# Patient Record
Sex: FEMALE | Race: WHITE | NOT HISPANIC OR LATINO | Employment: OTHER | URBAN - METROPOLITAN AREA
[De-identification: names, ages, dates, MRNs, and addresses within clinical notes are randomized per-mention and may not be internally consistent; named-entity substitution may affect disease eponyms.]

---

## 2017-01-05 ENCOUNTER — HOSPITAL ENCOUNTER (EMERGENCY)
Facility: HOSPITAL | Age: 47
Discharge: HOME | End: 2017-01-05
Attending: EMERGENCY MEDICINE | Admitting: EMERGENCY MEDICINE
Payer: COMMERCIAL

## 2017-01-05 VITALS
OXYGEN SATURATION: 96 % | WEIGHT: 165.79 LBS | BODY MASS INDEX: 29.38 KG/M2 | SYSTOLIC BLOOD PRESSURE: 138 MMHG | RESPIRATION RATE: 18 BRPM | DIASTOLIC BLOOD PRESSURE: 65 MMHG | HEIGHT: 63 IN | HEART RATE: 99 BPM | TEMPERATURE: 98.1 F

## 2017-01-05 DIAGNOSIS — R20.2 PARESTHESIAS: Primary | ICD-10-CM

## 2017-01-05 LAB
ALBUMIN SERPL BCP-MCNC: 4.2 G/DL (ref 3.5–5)
ALP SERPL-CCNC: 95 U/L (ref 46–116)
ALT SERPL W P-5'-P-CCNC: 23 U/L (ref 12–78)
ANION GAP SERPL CALCULATED.3IONS-SCNC: 13 MMOL/L (ref 4–13)
AST SERPL W P-5'-P-CCNC: 14 U/L (ref 5–45)
BASOPHILS # BLD AUTO: 0.03 THOUSANDS/ΜL (ref 0–0.1)
BASOPHILS NFR BLD AUTO: 0 % (ref 0–1)
BILIRUB SERPL-MCNC: 0.5 MG/DL (ref 0.2–1)
BUN SERPL-MCNC: 10 MG/DL (ref 5–25)
CALCIUM SERPL-MCNC: 9.3 MG/DL (ref 8.3–10.1)
CHLORIDE SERPL-SCNC: 102 MMOL/L (ref 100–108)
CO2 SERPL-SCNC: 25 MMOL/L (ref 21–32)
CREAT SERPL-MCNC: 0.68 MG/DL (ref 0.6–1.3)
EOSINOPHIL # BLD AUTO: 0.19 THOUSAND/ΜL (ref 0–0.61)
EOSINOPHIL NFR BLD AUTO: 2 % (ref 0–6)
ERYTHROCYTE [DISTWIDTH] IN BLOOD BY AUTOMATED COUNT: 12.8 % (ref 11.6–15.1)
GFR SERPL CREATININE-BSD FRML MDRD: >60 ML/MIN/1.73SQ M
GLUCOSE SERPL-MCNC: 128 MG/DL (ref 65–140)
HCT VFR BLD AUTO: 43.6 % (ref 34.8–46.1)
HGB BLD-MCNC: 15 G/DL (ref 11.5–15.4)
LYMPHOCYTES # BLD AUTO: 1.71 THOUSANDS/ΜL (ref 0.6–4.47)
LYMPHOCYTES NFR BLD AUTO: 22 % (ref 14–44)
MCH RBC QN AUTO: 30.4 PG (ref 26.8–34.3)
MCHC RBC AUTO-ENTMCNC: 34.4 G/DL (ref 31.4–37.4)
MCV RBC AUTO: 88 FL (ref 82–98)
MONOCYTES # BLD AUTO: 0.36 THOUSAND/ΜL (ref 0.17–1.22)
MONOCYTES NFR BLD AUTO: 5 % (ref 4–12)
NEUTROPHILS # BLD AUTO: 5.58 THOUSANDS/ΜL (ref 1.85–7.62)
NEUTS SEG NFR BLD AUTO: 71 % (ref 43–75)
NRBC BLD AUTO-RTO: 0 /100 WBCS
PLATELET # BLD AUTO: 316 THOUSANDS/UL (ref 149–390)
PMV BLD AUTO: 9.4 FL (ref 8.9–12.7)
POTASSIUM SERPL-SCNC: 4 MMOL/L (ref 3.5–5.3)
PROT SERPL-MCNC: 7.7 G/DL (ref 6.4–8.2)
RBC # BLD AUTO: 4.94 MILLION/UL (ref 3.81–5.12)
SODIUM SERPL-SCNC: 140 MMOL/L (ref 136–145)
TSH SERPL DL<=0.05 MIU/L-ACNC: 0.89 UIU/ML (ref 0.36–3.74)
WBC # BLD AUTO: 7.89 THOUSAND/UL (ref 4.31–10.16)

## 2017-01-05 PROCEDURE — 85025 COMPLETE CBC W/AUTO DIFF WBC: CPT | Performed by: EMERGENCY MEDICINE

## 2017-01-05 PROCEDURE — 36415 COLL VENOUS BLD VENIPUNCTURE: CPT | Performed by: EMERGENCY MEDICINE

## 2017-01-05 PROCEDURE — 99284 EMERGENCY DEPT VISIT MOD MDM: CPT

## 2017-01-05 PROCEDURE — 80053 COMPREHEN METABOLIC PANEL: CPT | Performed by: EMERGENCY MEDICINE

## 2017-01-05 PROCEDURE — 84443 ASSAY THYROID STIM HORMONE: CPT | Performed by: EMERGENCY MEDICINE

## 2017-01-05 RX ORDER — OXYCODONE AND ACETAMINOPHEN 10; 325 MG/1; MG/1
1 TABLET ORAL EVERY 6 HOURS PRN
COMMUNITY
End: 2017-02-16 | Stop reason: ALTCHOICE

## 2017-01-05 RX ORDER — LEVOTHYROXINE SODIUM 0.07 MG/1
75 TABLET ORAL DAILY
Status: ON HOLD | COMMUNITY
End: 2017-03-02 | Stop reason: CLARIF

## 2017-01-05 RX ORDER — MORPHINE SULFATE 20 MG/1
15 CAPSULE, EXTENDED RELEASE ORAL 3 TIMES DAILY
COMMUNITY

## 2017-01-05 RX ORDER — BACLOFEN 10 MG/1
10 TABLET ORAL 3 TIMES DAILY
COMMUNITY

## 2017-01-11 ENCOUNTER — TELEPHONE (OUTPATIENT)
Dept: FAMILY MEDICINE CLINIC | Facility: CLINIC | Age: 47
End: 2017-01-11

## 2017-01-11 NOTE — TELEPHONE ENCOUNTER
Patient on [employer] list. Called phone number listed on registration paperwork. Phone kept ringing and then busy signal. Not able to leave voicemail. Still under  care?

## 2017-02-06 ENCOUNTER — HOSPITAL ENCOUNTER (OUTPATIENT)
Facility: HOSPITAL | Age: 47
Setting detail: OBSERVATION
Discharge: HOME | End: 2017-02-07
Attending: EMERGENCY MEDICINE | Admitting: INTERNAL MEDICINE
Payer: COMMERCIAL

## 2017-02-06 DIAGNOSIS — M54.9 BACK PAIN: Primary | ICD-10-CM

## 2017-02-06 LAB
ALBUMIN SERPL BCP-MCNC: 3.7 G/DL (ref 3.5–5)
ALP SERPL-CCNC: 90 U/L (ref 46–116)
ALT SERPL W P-5'-P-CCNC: 28 U/L (ref 12–78)
ANION GAP SERPL CALCULATED.3IONS-SCNC: 8 MMOL/L (ref 4–13)
AST SERPL W P-5'-P-CCNC: 18 U/L (ref 5–45)
BASOPHILS # BLD AUTO: 0.04 THOUSANDS/ΜL (ref 0–0.1)
BASOPHILS NFR BLD AUTO: 1 % (ref 0–1)
BILIRUB SERPL-MCNC: 0.2 MG/DL (ref 0.2–1)
BILIRUB UR QL STRIP: NEGATIVE
BUN SERPL-MCNC: 9 MG/DL (ref 5–25)
CALCIUM SERPL-MCNC: 9.1 MG/DL (ref 8.3–10.1)
CHLORIDE SERPL-SCNC: 103 MMOL/L (ref 100–108)
CLARITY UR: CLEAR
CO2 SERPL-SCNC: 31 MMOL/L (ref 21–32)
COLOR UR: YELLOW
CREAT SERPL-MCNC: 0.81 MG/DL (ref 0.6–1.3)
EOSINOPHIL # BLD AUTO: 0.52 THOUSAND/ΜL (ref 0–0.61)
EOSINOPHIL NFR BLD AUTO: 8 % (ref 0–6)
ERYTHROCYTE [DISTWIDTH] IN BLOOD BY AUTOMATED COUNT: 12.9 % (ref 11.6–15.1)
GFR SERPL CREATININE-BSD FRML MDRD: >60 ML/MIN/1.73SQ M
GLUCOSE SERPL-MCNC: 100 MG/DL (ref 65–140)
GLUCOSE UR STRIP-MCNC: NEGATIVE MG/DL
HCG UR QL: NEGATIVE
HCT VFR BLD AUTO: 39 % (ref 34.8–46.1)
HGB BLD-MCNC: 13.1 G/DL (ref 11.5–15.4)
HGB UR QL STRIP.AUTO: NEGATIVE
KETONES UR STRIP-MCNC: NEGATIVE MG/DL
LEUKOCYTE ESTERASE UR QL STRIP: NEGATIVE
LIPASE SERPL-CCNC: 86 U/L (ref 73–393)
LYMPHOCYTES # BLD AUTO: 3.26 THOUSANDS/ΜL (ref 0.6–4.47)
LYMPHOCYTES NFR BLD AUTO: 48 % (ref 14–44)
MCH RBC QN AUTO: 30.5 PG (ref 26.8–34.3)
MCHC RBC AUTO-ENTMCNC: 33.6 G/DL (ref 31.4–37.4)
MCV RBC AUTO: 91 FL (ref 82–98)
MONOCYTES # BLD AUTO: 0.6 THOUSAND/ΜL (ref 0.17–1.22)
MONOCYTES NFR BLD AUTO: 9 % (ref 4–12)
NEUTROPHILS # BLD AUTO: 2.35 THOUSANDS/ΜL (ref 1.85–7.62)
NEUTS SEG NFR BLD AUTO: 35 % (ref 43–75)
NITRITE UR QL STRIP: NEGATIVE
NRBC BLD AUTO-RTO: 0 /100 WBCS
PH UR STRIP.AUTO: 5.5 [PH] (ref 4.5–8)
PLATELET # BLD AUTO: 351 THOUSANDS/UL (ref 149–390)
PMV BLD AUTO: 9.3 FL (ref 8.9–12.7)
POTASSIUM SERPL-SCNC: 3.6 MMOL/L (ref 3.5–5.3)
PROT SERPL-MCNC: 7.3 G/DL (ref 6.4–8.2)
PROT UR STRIP-MCNC: NEGATIVE MG/DL
RBC # BLD AUTO: 4.29 MILLION/UL (ref 3.81–5.12)
SODIUM SERPL-SCNC: 142 MMOL/L (ref 136–145)
SP GR UR STRIP.AUTO: 1.01 (ref 1–1.03)
TSH SERPL DL<=0.05 MIU/L-ACNC: 4.13 UIU/ML (ref 0.36–3.74)
UROBILINOGEN UR QL STRIP.AUTO: 0.2 E.U./DL
WBC # BLD AUTO: 6.78 THOUSAND/UL (ref 4.31–10.16)

## 2017-02-06 PROCEDURE — 84443 ASSAY THYROID STIM HORMONE: CPT | Performed by: EMERGENCY MEDICINE

## 2017-02-06 PROCEDURE — 96375 TX/PRO/DX INJ NEW DRUG ADDON: CPT

## 2017-02-06 PROCEDURE — 96374 THER/PROPH/DIAG INJ IV PUSH: CPT

## 2017-02-06 PROCEDURE — 83690 ASSAY OF LIPASE: CPT | Performed by: EMERGENCY MEDICINE

## 2017-02-06 PROCEDURE — 81003 URINALYSIS AUTO W/O SCOPE: CPT | Performed by: EMERGENCY MEDICINE

## 2017-02-06 PROCEDURE — 81025 URINE PREGNANCY TEST: CPT | Performed by: EMERGENCY MEDICINE

## 2017-02-06 PROCEDURE — 93005 ELECTROCARDIOGRAM TRACING: CPT | Performed by: EMERGENCY MEDICINE

## 2017-02-06 PROCEDURE — 36415 COLL VENOUS BLD VENIPUNCTURE: CPT | Performed by: EMERGENCY MEDICINE

## 2017-02-06 PROCEDURE — 80053 COMPREHEN METABOLIC PANEL: CPT | Performed by: EMERGENCY MEDICINE

## 2017-02-06 PROCEDURE — 96361 HYDRATE IV INFUSION ADD-ON: CPT

## 2017-02-06 PROCEDURE — 85025 COMPLETE CBC W/AUTO DIFF WBC: CPT | Performed by: EMERGENCY MEDICINE

## 2017-02-06 RX ORDER — LORAZEPAM 2 MG/ML
0.5 INJECTION INTRAMUSCULAR ONCE
Status: COMPLETED | OUTPATIENT
Start: 2017-02-06 | End: 2017-02-06

## 2017-02-06 RX ORDER — MORPHINE SULFATE 4 MG/ML
4 INJECTION, SOLUTION INTRAMUSCULAR; INTRAVENOUS ONCE
Status: COMPLETED | OUTPATIENT
Start: 2017-02-06 | End: 2017-02-06

## 2017-02-06 RX ADMIN — LORAZEPAM 0.5 MG: 2 INJECTION INTRAMUSCULAR; INTRAVENOUS at 23:08

## 2017-02-06 RX ADMIN — SODIUM CHLORIDE 1000 ML: 0.9 INJECTION, SOLUTION INTRAVENOUS at 23:11

## 2017-02-06 RX ADMIN — MORPHINE SULFATE 4 MG: 4 INJECTION, SOLUTION INTRAMUSCULAR; INTRAVENOUS at 23:07

## 2017-02-07 ENCOUNTER — TRANSCRIBE ORDERS (OUTPATIENT)
Dept: MRI IMAGING | Facility: HOSPITAL | Age: 47
End: 2017-02-07

## 2017-02-07 ENCOUNTER — APPOINTMENT (EMERGENCY)
Dept: CT IMAGING | Facility: HOSPITAL | Age: 47
End: 2017-02-07
Payer: COMMERCIAL

## 2017-02-07 ENCOUNTER — HOSPITAL ENCOUNTER (OUTPATIENT)
Dept: MRI IMAGING | Facility: HOSPITAL | Age: 47
Discharge: HOME | End: 2017-02-07
Attending: EMERGENCY MEDICINE
Payer: COMMERCIAL

## 2017-02-07 VITALS
RESPIRATION RATE: 18 BRPM | SYSTOLIC BLOOD PRESSURE: 114 MMHG | WEIGHT: 165 LBS | HEART RATE: 86 BPM | TEMPERATURE: 98.2 F | BODY MASS INDEX: 28.17 KG/M2 | OXYGEN SATURATION: 100 % | HEIGHT: 64 IN | DIASTOLIC BLOOD PRESSURE: 65 MMHG

## 2017-02-07 LAB
ATRIAL RATE: 71 BPM
P AXIS: 61 DEGREES
PR INTERVAL: 150 MS
QRS AXIS: 72 DEGREES
QRSD INTERVAL: 80 MS
QT INTERVAL: 398 MS
QTC INTERVAL: 432 MS
T WAVE AXIS: 49 DEGREES
VENTRICULAR RATE: 71 BPM

## 2017-02-07 PROCEDURE — 72148 MRI LUMBAR SPINE W/O DYE: CPT

## 2017-02-07 PROCEDURE — 99284 EMERGENCY DEPT VISIT MOD MDM: CPT

## 2017-02-07 PROCEDURE — 96361 HYDRATE IV INFUSION ADD-ON: CPT

## 2017-02-07 PROCEDURE — 74177 CT ABD & PELVIS W/CONTRAST: CPT

## 2017-02-07 RX ORDER — KETOROLAC TROMETHAMINE 30 MG/ML
15 INJECTION, SOLUTION INTRAMUSCULAR; INTRAVENOUS ONCE
Status: COMPLETED | OUTPATIENT
Start: 2017-02-07 | End: 2017-02-07

## 2017-02-07 RX ADMIN — IOHEXOL 100 ML: 350 INJECTION, SOLUTION INTRAVENOUS at 00:45

## 2017-02-07 RX ADMIN — KETOROLAC TROMETHAMINE 15 MG: 30 INJECTION, SOLUTION INTRAMUSCULAR at 02:40

## 2017-02-08 ENCOUNTER — HOSPITAL ENCOUNTER (OUTPATIENT)
Dept: MRI IMAGING | Facility: HOSPITAL | Age: 47
Discharge: HOME | End: 2017-02-08
Attending: EMERGENCY MEDICINE
Payer: COMMERCIAL

## 2017-02-08 DIAGNOSIS — M54.9 BACK PAIN: ICD-10-CM

## 2017-02-10 ENCOUNTER — ALLSCRIPTS OFFICE VISIT (OUTPATIENT)
Dept: OTHER | Facility: OTHER | Age: 47
End: 2017-02-10

## 2017-02-10 DIAGNOSIS — R92.2 INCONCLUSIVE MAMMOGRAM: ICD-10-CM

## 2017-02-10 DIAGNOSIS — R10.9 ABDOMINAL PAIN: ICD-10-CM

## 2017-02-10 DIAGNOSIS — M79.10 MYALGIA: ICD-10-CM

## 2017-02-10 DIAGNOSIS — E04.9 NONTOXIC GOITER: ICD-10-CM

## 2017-02-10 DIAGNOSIS — G62.9 POLYNEUROPATHY: ICD-10-CM

## 2017-02-11 ENCOUNTER — APPOINTMENT (OUTPATIENT)
Dept: LAB | Facility: CLINIC | Age: 47
End: 2017-02-11
Payer: COMMERCIAL

## 2017-02-11 ENCOUNTER — HOSPITAL ENCOUNTER (EMERGENCY)
Facility: HOSPITAL | Age: 47
Discharge: HOME | End: 2017-02-11
Admitting: EMERGENCY MEDICINE
Payer: COMMERCIAL

## 2017-02-11 VITALS
HEART RATE: 118 BPM | OXYGEN SATURATION: 98 % | RESPIRATION RATE: 18 BRPM | HEIGHT: 64 IN | SYSTOLIC BLOOD PRESSURE: 139 MMHG | DIASTOLIC BLOOD PRESSURE: 86 MMHG | WEIGHT: 165 LBS | BODY MASS INDEX: 28.17 KG/M2 | TEMPERATURE: 97.8 F

## 2017-02-11 DIAGNOSIS — B37.0 ORAL THRUSH: ICD-10-CM

## 2017-02-11 DIAGNOSIS — M79.10 MYALGIA: ICD-10-CM

## 2017-02-11 DIAGNOSIS — B02.9 SHINGLES: Primary | ICD-10-CM

## 2017-02-11 DIAGNOSIS — G62.9 POLYNEUROPATHY: ICD-10-CM

## 2017-02-11 DIAGNOSIS — E04.9 NONTOXIC GOITER: ICD-10-CM

## 2017-02-11 LAB
25(OH)D3 SERPL-MCNC: 36.2 NG/ML (ref 30–100)
BASOPHILS # BLD AUTO: 0.03 THOUSANDS/ΜL (ref 0–0.1)
BASOPHILS NFR BLD AUTO: 0 % (ref 0–1)
CK SERPL-CCNC: 49 U/L (ref 26–192)
EOSINOPHIL # BLD AUTO: 0.35 THOUSAND/ΜL (ref 0–0.61)
EOSINOPHIL NFR BLD AUTO: 4 % (ref 0–6)
ERYTHROCYTE [DISTWIDTH] IN BLOOD BY AUTOMATED COUNT: 13.6 % (ref 11.6–15.1)
ERYTHROCYTE [SEDIMENTATION RATE] IN BLOOD: 14 MM/HOUR (ref 0–20)
FOLATE SERPL-MCNC: 18 NG/ML (ref 3.1–17.5)
HCT VFR BLD AUTO: 44.2 % (ref 34.8–46.1)
HGB BLD-MCNC: 15.1 G/DL (ref 11.5–15.4)
LYMPHOCYTES # BLD AUTO: 2.18 THOUSANDS/ΜL (ref 0.6–4.47)
LYMPHOCYTES NFR BLD AUTO: 23 % (ref 14–44)
MCH RBC QN AUTO: 31.2 PG (ref 26.8–34.3)
MCHC RBC AUTO-ENTMCNC: 34.2 G/DL (ref 31.4–37.4)
MCV RBC AUTO: 91 FL (ref 82–98)
MONOCYTES # BLD AUTO: 0.9 THOUSAND/ΜL (ref 0.17–1.22)
MONOCYTES NFR BLD AUTO: 10 % (ref 4–12)
NEUTROPHILS # BLD AUTO: 5.88 THOUSANDS/ΜL (ref 1.85–7.62)
NEUTS SEG NFR BLD AUTO: 63 % (ref 43–75)
NRBC BLD AUTO-RTO: 0 /100 WBCS
PLATELET # BLD AUTO: 405 THOUSANDS/UL (ref 149–390)
PMV BLD AUTO: 10.1 FL (ref 8.9–12.7)
RBC # BLD AUTO: 4.84 MILLION/UL (ref 3.81–5.12)
T3FREE SERPL-MCNC: 2.55 PG/ML (ref 2.3–4.2)
T4 FREE SERPL-MCNC: 1.59 NG/DL (ref 0.76–1.46)
TSH SERPL DL<=0.05 MIU/L-ACNC: 2.2 UIU/ML (ref 0.36–3.74)
VIT B12 SERPL-MCNC: 617 PG/ML (ref 100–900)
WBC # BLD AUTO: 9.37 THOUSAND/UL (ref 4.31–10.16)

## 2017-02-11 PROCEDURE — 84443 ASSAY THYROID STIM HORMONE: CPT

## 2017-02-11 PROCEDURE — 82175 ASSAY OF ARSENIC: CPT

## 2017-02-11 PROCEDURE — 82085 ASSAY OF ALDOLASE: CPT

## 2017-02-11 PROCEDURE — 86800 THYROGLOBULIN ANTIBODY: CPT

## 2017-02-11 PROCEDURE — 84439 ASSAY OF FREE THYROXINE: CPT

## 2017-02-11 PROCEDURE — 84481 FREE ASSAY (FT-3): CPT

## 2017-02-11 PROCEDURE — 36415 COLL VENOUS BLD VENIPUNCTURE: CPT

## 2017-02-11 PROCEDURE — 83825 ASSAY OF MERCURY: CPT

## 2017-02-11 PROCEDURE — 86376 MICROSOMAL ANTIBODY EACH: CPT

## 2017-02-11 PROCEDURE — 82306 VITAMIN D 25 HYDROXY: CPT

## 2017-02-11 PROCEDURE — 82550 ASSAY OF CK (CPK): CPT

## 2017-02-11 PROCEDURE — 81374 HLA I TYPING 1 ANTIGEN LR: CPT

## 2017-02-11 PROCEDURE — 86618 LYME DISEASE ANTIBODY: CPT

## 2017-02-11 PROCEDURE — 82607 VITAMIN B-12: CPT

## 2017-02-11 PROCEDURE — 83655 ASSAY OF LEAD: CPT

## 2017-02-11 PROCEDURE — 86592 SYPHILIS TEST NON-TREP QUAL: CPT

## 2017-02-11 PROCEDURE — 99282 EMERGENCY DEPT VISIT SF MDM: CPT

## 2017-02-11 PROCEDURE — 85652 RBC SED RATE AUTOMATED: CPT

## 2017-02-11 PROCEDURE — 82746 ASSAY OF FOLIC ACID SERUM: CPT

## 2017-02-11 PROCEDURE — 85025 COMPLETE CBC W/AUTO DIFF WBC: CPT

## 2017-02-11 PROCEDURE — 86038 ANTINUCLEAR ANTIBODIES: CPT

## 2017-02-11 PROCEDURE — 84207 ASSAY OF VITAMIN B-6: CPT

## 2017-02-11 RX ORDER — OXYCODONE HYDROCHLORIDE 5 MG/1
15 TABLET ORAL EVERY 4 HOURS PRN
Qty: 12 TABLET | Refills: 0 | Status: SHIPPED | OUTPATIENT
Start: 2017-02-11 | End: 2017-02-21

## 2017-02-11 RX ORDER — ACYCLOVIR 800 MG/1
800 TABLET ORAL
Qty: 35 TABLET | Refills: 0 | Status: SHIPPED | OUTPATIENT
Start: 2017-02-11 | End: 2017-02-19

## 2017-02-12 ENCOUNTER — HOSPITAL ENCOUNTER (EMERGENCY)
Facility: HOSPITAL | Age: 47
Discharge: HOME | End: 2017-02-12
Attending: EMERGENCY MEDICINE | Admitting: EMERGENCY MEDICINE
Payer: COMMERCIAL

## 2017-02-12 ENCOUNTER — GENERIC CONVERSION - ENCOUNTER (OUTPATIENT)
Dept: OTHER | Facility: OTHER | Age: 47
End: 2017-02-12

## 2017-02-12 VITALS
BODY MASS INDEX: 28.98 KG/M2 | DIASTOLIC BLOOD PRESSURE: 71 MMHG | HEART RATE: 100 BPM | TEMPERATURE: 98.5 F | HEIGHT: 64 IN | SYSTOLIC BLOOD PRESSURE: 115 MMHG | RESPIRATION RATE: 18 BRPM | OXYGEN SATURATION: 97 % | WEIGHT: 169.75 LBS

## 2017-02-12 DIAGNOSIS — R00.2 PALPITATIONS: Primary | ICD-10-CM

## 2017-02-12 DIAGNOSIS — T50.905A: ICD-10-CM

## 2017-02-12 LAB
ANION GAP SERPL CALCULATED.3IONS-SCNC: 10 MMOL/L (ref 4–13)
BASOPHILS # BLD AUTO: 0.03 THOUSANDS/ΜL (ref 0–0.1)
BASOPHILS NFR BLD AUTO: 0 % (ref 0–1)
BUN SERPL-MCNC: 16 MG/DL (ref 5–25)
CALCIUM SERPL-MCNC: 9.1 MG/DL (ref 8.3–10.1)
CHLORIDE SERPL-SCNC: 99 MMOL/L (ref 100–108)
CO2 SERPL-SCNC: 27 MMOL/L (ref 21–32)
CREAT SERPL-MCNC: 0.77 MG/DL (ref 0.6–1.3)
EOSINOPHIL # BLD AUTO: 0.33 THOUSAND/ΜL (ref 0–0.61)
EOSINOPHIL NFR BLD AUTO: 4 % (ref 0–6)
ERYTHROCYTE [DISTWIDTH] IN BLOOD BY AUTOMATED COUNT: 13 % (ref 11.6–15.1)
GFR SERPL CREATININE-BSD FRML MDRD: >60 ML/MIN/1.73SQ M
GLUCOSE SERPL-MCNC: 100 MG/DL (ref 65–140)
HCT VFR BLD AUTO: 40.4 % (ref 34.8–46.1)
HGB BLD-MCNC: 14 G/DL (ref 11.5–15.4)
LYMPHOCYTES # BLD AUTO: 2.22 THOUSANDS/ΜL (ref 0.6–4.47)
LYMPHOCYTES NFR BLD AUTO: 30 % (ref 14–44)
MAGNESIUM SERPL-MCNC: 1.7 MG/DL (ref 1.6–2.6)
MCH RBC QN AUTO: 30.4 PG (ref 26.8–34.3)
MCHC RBC AUTO-ENTMCNC: 34.7 G/DL (ref 31.4–37.4)
MCV RBC AUTO: 88 FL (ref 82–98)
MONOCYTES # BLD AUTO: 0.7 THOUSAND/ΜL (ref 0.17–1.22)
MONOCYTES NFR BLD AUTO: 9 % (ref 4–12)
NEUTROPHILS # BLD AUTO: 4.11 THOUSANDS/ΜL (ref 1.85–7.62)
NEUTS SEG NFR BLD AUTO: 56 % (ref 43–75)
NRBC BLD AUTO-RTO: 0 /100 WBCS
PHOSPHATE SERPL-MCNC: 4.7 MG/DL (ref 2.7–4.5)
PLATELET # BLD AUTO: 337 THOUSANDS/UL (ref 149–390)
PMV BLD AUTO: 8.9 FL (ref 8.9–12.7)
POTASSIUM SERPL-SCNC: 3.6 MMOL/L (ref 3.5–5.3)
RBC # BLD AUTO: 4.6 MILLION/UL (ref 3.81–5.12)
SODIUM SERPL-SCNC: 136 MMOL/L (ref 136–145)
TSH SERPL DL<=0.05 MIU/L-ACNC: 7.81 UIU/ML (ref 0.36–3.74)
WBC # BLD AUTO: 7.42 THOUSAND/UL (ref 4.31–10.16)

## 2017-02-12 PROCEDURE — 84100 ASSAY OF PHOSPHORUS: CPT | Performed by: EMERGENCY MEDICINE

## 2017-02-12 PROCEDURE — 84443 ASSAY THYROID STIM HORMONE: CPT | Performed by: EMERGENCY MEDICINE

## 2017-02-12 PROCEDURE — 99283 EMERGENCY DEPT VISIT LOW MDM: CPT

## 2017-02-12 PROCEDURE — A9270 NON-COVERED ITEM OR SERVICE: HCPCS

## 2017-02-12 PROCEDURE — 36415 COLL VENOUS BLD VENIPUNCTURE: CPT | Performed by: EMERGENCY MEDICINE

## 2017-02-12 PROCEDURE — 85025 COMPLETE CBC W/AUTO DIFF WBC: CPT | Performed by: EMERGENCY MEDICINE

## 2017-02-12 PROCEDURE — 93005 ELECTROCARDIOGRAM TRACING: CPT | Performed by: EMERGENCY MEDICINE

## 2017-02-12 PROCEDURE — 80048 BASIC METABOLIC PNL TOTAL CA: CPT | Performed by: EMERGENCY MEDICINE

## 2017-02-12 PROCEDURE — 96361 HYDRATE IV INFUSION ADD-ON: CPT

## 2017-02-12 PROCEDURE — 96374 THER/PROPH/DIAG INJ IV PUSH: CPT

## 2017-02-12 PROCEDURE — 83735 ASSAY OF MAGNESIUM: CPT | Performed by: EMERGENCY MEDICINE

## 2017-02-12 PROCEDURE — 93005 ELECTROCARDIOGRAM TRACING: CPT

## 2017-02-12 RX ORDER — OXYCODONE HYDROCHLORIDE AND ACETAMINOPHEN 5; 325 MG/1; MG/1
TABLET ORAL
Status: COMPLETED
Start: 2017-02-12 | End: 2017-02-12

## 2017-02-12 RX ORDER — LEVOTHYROXINE SODIUM 0.12 MG/1
125 TABLET ORAL DAILY
Qty: 30 TABLET | Refills: 0 | Status: SHIPPED | OUTPATIENT
Start: 2017-02-12 | End: 2017-02-18 | Stop reason: ALTCHOICE

## 2017-02-12 RX ORDER — LORAZEPAM 2 MG/ML
1 INJECTION INTRAMUSCULAR ONCE
Status: COMPLETED | OUTPATIENT
Start: 2017-02-12 | End: 2017-02-12

## 2017-02-12 RX ORDER — OXYCODONE HYDROCHLORIDE AND ACETAMINOPHEN 5; 325 MG/1; MG/1
1 TABLET ORAL ONCE
Status: COMPLETED | OUTPATIENT
Start: 2017-02-12 | End: 2017-02-12

## 2017-02-12 RX ADMIN — OXYCODONE HYDROCHLORIDE AND ACETAMINOPHEN 1 TABLET: 5; 325 TABLET ORAL at 17:57

## 2017-02-12 RX ADMIN — LORAZEPAM 1 MG: 2 INJECTION INTRAMUSCULAR; INTRAVENOUS at 16:22

## 2017-02-12 RX ADMIN — SODIUM CHLORIDE 1000 ML: 0.9 INJECTION, SOLUTION INTRAVENOUS at 16:22

## 2017-02-13 ENCOUNTER — ALLSCRIPTS OFFICE VISIT (OUTPATIENT)
Dept: OTHER | Facility: OTHER | Age: 47
End: 2017-02-13

## 2017-02-13 LAB
ALDOLASE SERPL-CCNC: 6.2 U/L (ref 3.3–10.3)
ATRIAL RATE: 101 BPM
ATRIAL RATE: 104 BPM
B BURGDOR IGG SER IA-ACNC: 0.08
B BURGDOR IGM SER IA-ACNC: 0.16
P AXIS: 53 DEGREES
P AXIS: 55 DEGREES
PR INTERVAL: 122 MS
PR INTERVAL: 122 MS
QRS AXIS: 66 DEGREES
QRS AXIS: 68 DEGREES
QRSD INTERVAL: 78 MS
QRSD INTERVAL: 80 MS
QT INTERVAL: 336 MS
QT INTERVAL: 336 MS
QTC INTERVAL: 435 MS
QTC INTERVAL: 441 MS
RPR SER QL: NORMAL
RYE IGE QN: NEGATIVE
T WAVE AXIS: 44 DEGREES
T WAVE AXIS: 49 DEGREES
VENTRICULAR RATE: 101 BPM
VENTRICULAR RATE: 104 BPM

## 2017-02-14 LAB
THYROGLOB AB SERPL-ACNC: 6.4 IU/ML (ref 0–0.9)
THYROPEROXIDASE AB SERPL-ACNC: 449 IU/ML (ref 0–34)

## 2017-02-15 ENCOUNTER — HOSPITAL ENCOUNTER (OUTPATIENT)
Dept: ULTRASOUND IMAGING | Facility: HOSPITAL | Age: 47
Discharge: HOME | End: 2017-02-15
Payer: COMMERCIAL

## 2017-02-15 DIAGNOSIS — E04.9 NONTOXIC GOITER: ICD-10-CM

## 2017-02-15 LAB
HLA-B27 QL NAA+PROBE: NEGATIVE
VIT B6 SERPL-MCNC: 25.7 UG/L (ref 2–32.8)

## 2017-02-15 PROCEDURE — 76536 US EXAM OF HEAD AND NECK: CPT

## 2017-02-16 ENCOUNTER — HOSPITAL ENCOUNTER (EMERGENCY)
Facility: HOSPITAL | Age: 47
Discharge: HOME | End: 2017-02-16
Attending: EMERGENCY MEDICINE | Admitting: EMERGENCY MEDICINE
Payer: COMMERCIAL

## 2017-02-16 ENCOUNTER — ALLSCRIPTS OFFICE VISIT (OUTPATIENT)
Dept: OTHER | Facility: OTHER | Age: 47
End: 2017-02-16

## 2017-02-16 ENCOUNTER — APPOINTMENT (EMERGENCY)
Dept: RADIOLOGY | Facility: HOSPITAL | Age: 47
End: 2017-02-16
Payer: COMMERCIAL

## 2017-02-16 VITALS
HEIGHT: 64 IN | TEMPERATURE: 98.3 F | BODY MASS INDEX: 28.85 KG/M2 | RESPIRATION RATE: 16 BRPM | OXYGEN SATURATION: 98 % | WEIGHT: 169 LBS | DIASTOLIC BLOOD PRESSURE: 88 MMHG | HEART RATE: 92 BPM | SYSTOLIC BLOOD PRESSURE: 120 MMHG

## 2017-02-16 DIAGNOSIS — K21.9 ACID REFLUX: Primary | ICD-10-CM

## 2017-02-16 LAB
ATRIAL RATE: 88 BPM
P AXIS: 53 DEGREES
PR INTERVAL: 144 MS
QRS AXIS: 68 DEGREES
QRSD INTERVAL: 82 MS
QT INTERVAL: 378 MS
QTC INTERVAL: 457 MS
T WAVE AXIS: 50 DEGREES
VENTRICULAR RATE: 88 BPM

## 2017-02-16 PROCEDURE — 71020 HB CHEST X-RAY 2VW FRONTAL&LATL: CPT

## 2017-02-16 PROCEDURE — 99285 EMERGENCY DEPT VISIT HI MDM: CPT

## 2017-02-16 PROCEDURE — 93005 ELECTROCARDIOGRAM TRACING: CPT | Performed by: EMERGENCY MEDICINE

## 2017-02-16 RX ORDER — LANSOPRAZOLE 15 MG/1
15 CAPSULE, DELAYED RELEASE ORAL DAILY
Qty: 30 CAPSULE | Refills: 0 | Status: SHIPPED | OUTPATIENT
Start: 2017-02-16 | End: 2017-03-18

## 2017-02-17 ENCOUNTER — ALLSCRIPTS OFFICE VISIT (OUTPATIENT)
Dept: OTHER | Facility: OTHER | Age: 47
End: 2017-02-17

## 2017-02-17 LAB
ARSENIC BLD-MCNC: 8 UG/L (ref 2–23)
LEAD BLD-MCNC: 1 UG/DL (ref 0–19)
MERCURY BLD-MCNC: NORMAL UG/L (ref 0–14.9)

## 2017-02-18 ENCOUNTER — HOSPITAL ENCOUNTER (EMERGENCY)
Facility: HOSPITAL | Age: 47
Discharge: HOME | End: 2017-02-18
Attending: EMERGENCY MEDICINE
Payer: COMMERCIAL

## 2017-02-18 ENCOUNTER — APPOINTMENT (EMERGENCY)
Dept: RADIOLOGY | Facility: HOSPITAL | Age: 47
End: 2017-02-18
Payer: COMMERCIAL

## 2017-02-18 ENCOUNTER — GENERIC CONVERSION - ENCOUNTER (OUTPATIENT)
Dept: OTHER | Facility: OTHER | Age: 47
End: 2017-02-18

## 2017-02-18 ENCOUNTER — HOSPITAL ENCOUNTER (EMERGENCY)
Facility: HOSPITAL | Age: 47
Discharge: HOME | End: 2017-02-18
Attending: EMERGENCY MEDICINE | Admitting: EMERGENCY MEDICINE
Payer: COMMERCIAL

## 2017-02-18 VITALS
HEIGHT: 64 IN | RESPIRATION RATE: 18 BRPM | SYSTOLIC BLOOD PRESSURE: 136 MMHG | BODY MASS INDEX: 30.86 KG/M2 | OXYGEN SATURATION: 99 % | DIASTOLIC BLOOD PRESSURE: 76 MMHG | WEIGHT: 180.78 LBS | HEART RATE: 96 BPM | TEMPERATURE: 98.6 F

## 2017-02-18 VITALS
WEIGHT: 180.78 LBS | TEMPERATURE: 98 F | RESPIRATION RATE: 16 BRPM | DIASTOLIC BLOOD PRESSURE: 71 MMHG | SYSTOLIC BLOOD PRESSURE: 133 MMHG | HEIGHT: 64 IN | BODY MASS INDEX: 30.86 KG/M2 | HEART RATE: 93 BPM | OXYGEN SATURATION: 95 %

## 2017-02-18 DIAGNOSIS — F41.0 PANIC ATTACK: Primary | ICD-10-CM

## 2017-02-18 DIAGNOSIS — R00.2 PALPITATIONS: Primary | ICD-10-CM

## 2017-02-18 LAB
ALBUMIN SERPL BCP-MCNC: 3.8 G/DL (ref 3.5–5)
ALP SERPL-CCNC: 103 U/L (ref 46–116)
ALT SERPL W P-5'-P-CCNC: 26 U/L (ref 12–78)
ANION GAP SERPL CALCULATED.3IONS-SCNC: 12 MMOL/L (ref 4–13)
AST SERPL W P-5'-P-CCNC: 13 U/L (ref 5–45)
BASOPHILS # BLD AUTO: 0.06 THOUSANDS/ΜL (ref 0–0.1)
BASOPHILS NFR BLD AUTO: 1 % (ref 0–1)
BILIRUB SERPL-MCNC: 0.5 MG/DL (ref 0.2–1)
BILIRUB UR QL STRIP: NEGATIVE
BUN SERPL-MCNC: 8 MG/DL (ref 5–25)
CALCIUM SERPL-MCNC: 9.3 MG/DL (ref 8.3–10.1)
CHLORIDE SERPL-SCNC: 102 MMOL/L (ref 100–108)
CLARITY UR: CLEAR
CO2 SERPL-SCNC: 26 MMOL/L (ref 21–32)
COLOR UR: YELLOW
CREAT SERPL-MCNC: 0.67 MG/DL (ref 0.6–1.3)
EOSINOPHIL # BLD AUTO: 0.21 THOUSAND/ΜL (ref 0–0.61)
EOSINOPHIL NFR BLD AUTO: 3 % (ref 0–6)
ERYTHROCYTE [DISTWIDTH] IN BLOOD BY AUTOMATED COUNT: 12.9 % (ref 11.6–15.1)
GFR SERPL CREATININE-BSD FRML MDRD: >60 ML/MIN/1.73SQ M
GLUCOSE SERPL-MCNC: 104 MG/DL (ref 65–140)
GLUCOSE UR STRIP-MCNC: NEGATIVE MG/DL
HCG UR QL: NEGATIVE
HCT VFR BLD AUTO: 37.5 % (ref 34.8–46.1)
HGB BLD-MCNC: 12.8 G/DL (ref 11.5–15.4)
HGB UR QL STRIP.AUTO: NEGATIVE
KETONES UR STRIP-MCNC: NEGATIVE MG/DL
LEUKOCYTE ESTERASE UR QL STRIP: NEGATIVE
LIPASE SERPL-CCNC: 81 U/L (ref 73–393)
LYMPHOCYTES # BLD AUTO: 3.41 THOUSANDS/ΜL (ref 0.6–4.47)
LYMPHOCYTES NFR BLD AUTO: 42 % (ref 14–44)
MCH RBC QN AUTO: 30.2 PG (ref 26.8–34.3)
MCHC RBC AUTO-ENTMCNC: 34.1 G/DL (ref 31.4–37.4)
MCV RBC AUTO: 88 FL (ref 82–98)
MONOCYTES # BLD AUTO: 0.54 THOUSAND/ΜL (ref 0.17–1.22)
MONOCYTES NFR BLD AUTO: 7 % (ref 4–12)
NEUTROPHILS # BLD AUTO: 3.93 THOUSANDS/ΜL (ref 1.85–7.62)
NEUTS SEG NFR BLD AUTO: 48 % (ref 43–75)
NITRITE UR QL STRIP: NEGATIVE
NRBC BLD AUTO-RTO: 0 /100 WBCS
PH UR STRIP.AUTO: 5.5 [PH] (ref 4.5–8)
PLATELET # BLD AUTO: 356 THOUSANDS/UL (ref 149–390)
PMV BLD AUTO: 8.7 FL (ref 8.9–12.7)
POTASSIUM SERPL-SCNC: 3.2 MMOL/L (ref 3.5–5.3)
PROT SERPL-MCNC: 7.3 G/DL (ref 6.4–8.2)
PROT UR STRIP-MCNC: NEGATIVE MG/DL
RBC # BLD AUTO: 4.24 MILLION/UL (ref 3.81–5.12)
SODIUM SERPL-SCNC: 140 MMOL/L (ref 136–145)
SP GR UR STRIP.AUTO: <=1.005 (ref 1–1.03)
T3FREE SERPL-MCNC: 3.06 PG/ML (ref 2.3–4.2)
T4 FREE SERPL-MCNC: 1.93 NG/DL (ref 0.76–1.46)
TROPONIN I SERPL-MCNC: <0.02 NG/ML
TSH SERPL DL<=0.05 MIU/L-ACNC: 4.58 UIU/ML (ref 0.36–3.74)
UROBILINOGEN UR QL STRIP.AUTO: 0.2 E.U./DL
WBC # BLD AUTO: 8.17 THOUSAND/UL (ref 4.31–10.16)

## 2017-02-18 PROCEDURE — 80053 COMPREHEN METABOLIC PANEL: CPT | Performed by: PHYSICIAN ASSISTANT

## 2017-02-18 PROCEDURE — 84443 ASSAY THYROID STIM HORMONE: CPT | Performed by: PHYSICIAN ASSISTANT

## 2017-02-18 PROCEDURE — 96374 THER/PROPH/DIAG INJ IV PUSH: CPT

## 2017-02-18 PROCEDURE — 81003 URINALYSIS AUTO W/O SCOPE: CPT | Performed by: PHYSICIAN ASSISTANT

## 2017-02-18 PROCEDURE — 84481 FREE ASSAY (FT-3): CPT | Performed by: PHYSICIAN ASSISTANT

## 2017-02-18 PROCEDURE — 99284 EMERGENCY DEPT VISIT MOD MDM: CPT

## 2017-02-18 PROCEDURE — 93005 ELECTROCARDIOGRAM TRACING: CPT

## 2017-02-18 PROCEDURE — 96361 HYDRATE IV INFUSION ADD-ON: CPT

## 2017-02-18 PROCEDURE — 99283 EMERGENCY DEPT VISIT LOW MDM: CPT

## 2017-02-18 PROCEDURE — 85025 COMPLETE CBC W/AUTO DIFF WBC: CPT | Performed by: PHYSICIAN ASSISTANT

## 2017-02-18 PROCEDURE — 83690 ASSAY OF LIPASE: CPT | Performed by: PHYSICIAN ASSISTANT

## 2017-02-18 PROCEDURE — 71020 HB CHEST X-RAY 2VW FRONTAL&LATL: CPT

## 2017-02-18 PROCEDURE — 93005 ELECTROCARDIOGRAM TRACING: CPT | Performed by: PHYSICIAN ASSISTANT

## 2017-02-18 PROCEDURE — 81025 URINE PREGNANCY TEST: CPT | Performed by: PHYSICIAN ASSISTANT

## 2017-02-18 PROCEDURE — 84484 ASSAY OF TROPONIN QUANT: CPT | Performed by: PHYSICIAN ASSISTANT

## 2017-02-18 PROCEDURE — 84439 ASSAY OF FREE THYROXINE: CPT | Performed by: PHYSICIAN ASSISTANT

## 2017-02-18 PROCEDURE — 36415 COLL VENOUS BLD VENIPUNCTURE: CPT | Performed by: PHYSICIAN ASSISTANT

## 2017-02-18 RX ORDER — LORAZEPAM 2 MG/ML
2 INJECTION INTRAMUSCULAR ONCE
Status: COMPLETED | OUTPATIENT
Start: 2017-02-18 | End: 2017-02-18

## 2017-02-18 RX ADMIN — SODIUM CHLORIDE 1000 ML: 0.9 INJECTION, SOLUTION INTRAVENOUS at 17:37

## 2017-02-18 RX ADMIN — LORAZEPAM 2 MG: 2 INJECTION INTRAMUSCULAR; INTRAVENOUS at 17:38

## 2017-02-19 ENCOUNTER — HOSPITAL ENCOUNTER (EMERGENCY)
Facility: HOSPITAL | Age: 47
Discharge: HOME | End: 2017-02-19
Attending: EMERGENCY MEDICINE
Payer: COMMERCIAL

## 2017-02-19 VITALS
OXYGEN SATURATION: 100 % | HEART RATE: 103 BPM | TEMPERATURE: 98.1 F | DIASTOLIC BLOOD PRESSURE: 56 MMHG | SYSTOLIC BLOOD PRESSURE: 116 MMHG | RESPIRATION RATE: 17 BRPM

## 2017-02-19 DIAGNOSIS — F41.0 PANIC ATTACK: Primary | ICD-10-CM

## 2017-02-19 PROCEDURE — A9270 NON-COVERED ITEM OR SERVICE: HCPCS | Performed by: EMERGENCY MEDICINE

## 2017-02-19 PROCEDURE — 93005 ELECTROCARDIOGRAM TRACING: CPT | Performed by: EMERGENCY MEDICINE

## 2017-02-19 PROCEDURE — 99284 EMERGENCY DEPT VISIT MOD MDM: CPT

## 2017-02-19 RX ORDER — LORAZEPAM 1 MG/1
1 TABLET ORAL ONCE
Status: COMPLETED | OUTPATIENT
Start: 2017-02-19 | End: 2017-02-19

## 2017-02-19 RX ORDER — ALPRAZOLAM 0.5 MG/1
0.5 TABLET ORAL
Qty: 6 TABLET | Refills: 0 | Status: SHIPPED | OUTPATIENT
Start: 2017-02-19

## 2017-02-19 RX ADMIN — LORAZEPAM 1 MG: 1 TABLET ORAL at 05:50

## 2017-02-20 ENCOUNTER — ALLSCRIPTS OFFICE VISIT (OUTPATIENT)
Dept: OTHER | Facility: OTHER | Age: 47
End: 2017-02-20

## 2017-02-20 LAB
ATRIAL RATE: 113 BPM
ATRIAL RATE: 94 BPM
ATRIAL RATE: 99 BPM
P AXIS: 57 DEGREES
P AXIS: 62 DEGREES
P AXIS: 65 DEGREES
PR INTERVAL: 134 MS
PR INTERVAL: 134 MS
PR INTERVAL: 144 MS
QRS AXIS: 63 DEGREES
QRS AXIS: 70 DEGREES
QRS AXIS: 76 DEGREES
QRSD INTERVAL: 78 MS
QRSD INTERVAL: 82 MS
QRSD INTERVAL: 82 MS
QT INTERVAL: 334 MS
QT INTERVAL: 368 MS
QT INTERVAL: 372 MS
QTC INTERVAL: 458 MS
QTC INTERVAL: 465 MS
QTC INTERVAL: 472 MS
T WAVE AXIS: 26 DEGREES
T WAVE AXIS: 38 DEGREES
T WAVE AXIS: 54 DEGREES
VENTRICULAR RATE: 113 BPM
VENTRICULAR RATE: 94 BPM
VENTRICULAR RATE: 99 BPM

## 2017-02-21 ENCOUNTER — ALLSCRIPTS OFFICE VISIT (OUTPATIENT)
Dept: OTHER | Facility: OTHER | Age: 47
End: 2017-02-21

## 2017-02-22 ENCOUNTER — ALLSCRIPTS OFFICE VISIT (OUTPATIENT)
Dept: OTHER | Facility: OTHER | Age: 47
End: 2017-02-22

## 2017-02-23 ENCOUNTER — ALLSCRIPTS OFFICE VISIT (OUTPATIENT)
Dept: OTHER | Facility: OTHER | Age: 47
End: 2017-02-23

## 2017-02-25 ENCOUNTER — LAB CONVERSION - ENCOUNTER (OUTPATIENT)
Dept: OTHER | Facility: OTHER | Age: 47
End: 2017-02-25

## 2017-02-25 LAB
CHLAMYDIA TRACHOMATIS BY MOL. METHOD (HISTORICAL): NOT DETECTED
GC BY MOL. METHOD (HISTORICAL): NOT DETECTED
HPV 18 (HISTORICAL): NOT DETECTED
HPV HIGH RISK 16/18 (HISTORICAL): DETECTED
HPV16 (HISTORICAL): NOT DETECTED
PAP (HISTORICAL): ABNORMAL

## 2017-02-27 ENCOUNTER — GENERIC CONVERSION - ENCOUNTER (OUTPATIENT)
Dept: OTHER | Facility: OTHER | Age: 47
End: 2017-02-27

## 2017-02-28 RX ORDER — ESCITALOPRAM OXALATE 5 MG/1
5 TABLET ORAL DAILY
COMMUNITY

## 2017-02-28 RX ORDER — MULTIVIT-MIN/IRON FUM/FOLIC AC 7.5 MG-4
1 TABLET ORAL DAILY
COMMUNITY

## 2017-03-01 ENCOUNTER — ANESTHESIA EVENT (OUTPATIENT)
Dept: PERIOP | Facility: HOSPITAL | Age: 47
End: 2017-03-01
Payer: COMMERCIAL

## 2017-03-02 ENCOUNTER — GENERIC CONVERSION - ENCOUNTER (OUTPATIENT)
Dept: OTHER | Facility: OTHER | Age: 47
End: 2017-03-02

## 2017-03-02 ENCOUNTER — ANESTHESIA (OUTPATIENT)
Dept: PERIOP | Facility: HOSPITAL | Age: 47
End: 2017-03-02
Payer: COMMERCIAL

## 2017-03-02 ENCOUNTER — HOSPITAL ENCOUNTER (OUTPATIENT)
Facility: HOSPITAL | Age: 47
Setting detail: OUTPATIENT SURGERY
Discharge: HOME | End: 2017-03-02
Attending: INTERNAL MEDICINE | Admitting: INTERNAL MEDICINE
Payer: COMMERCIAL

## 2017-03-02 VITALS
RESPIRATION RATE: 24 BRPM | BODY MASS INDEX: 29.09 KG/M2 | HEART RATE: 62 BPM | HEIGHT: 64 IN | SYSTOLIC BLOOD PRESSURE: 110 MMHG | OXYGEN SATURATION: 98 % | WEIGHT: 170.42 LBS | DIASTOLIC BLOOD PRESSURE: 69 MMHG | TEMPERATURE: 97.6 F

## 2017-03-02 DIAGNOSIS — R10.9 ABDOMINAL PAIN: ICD-10-CM

## 2017-03-02 PROCEDURE — 88305 TISSUE EXAM BY PATHOLOGIST: CPT | Performed by: INTERNAL MEDICINE

## 2017-03-02 PROCEDURE — 88342 IMHCHEM/IMCYTCHM 1ST ANTB: CPT | Performed by: INTERNAL MEDICINE

## 2017-03-02 PROCEDURE — 88341 IMHCHEM/IMCYTCHM EA ADD ANTB: CPT | Performed by: INTERNAL MEDICINE

## 2017-03-02 RX ORDER — PROPOFOL 10 MG/ML
INJECTION, EMULSION INTRAVENOUS AS NEEDED
Status: DISCONTINUED | OUTPATIENT
Start: 2017-03-02 | End: 2017-03-02 | Stop reason: SURG

## 2017-03-02 RX ORDER — LEVOTHYROXINE SODIUM 0.12 MG/1
125 TABLET ORAL DAILY
COMMUNITY

## 2017-03-02 RX ORDER — SODIUM CHLORIDE, SODIUM LACTATE, POTASSIUM CHLORIDE, CALCIUM CHLORIDE 600; 310; 30; 20 MG/100ML; MG/100ML; MG/100ML; MG/100ML
50 INJECTION, SOLUTION INTRAVENOUS CONTINUOUS
Status: DISCONTINUED | OUTPATIENT
Start: 2017-03-02 | End: 2017-03-02 | Stop reason: HOSPADM

## 2017-03-02 RX ORDER — DICLOFENAC POTASSIUM 50 MG/1
50 TABLET, FILM COATED ORAL 4 TIMES DAILY
COMMUNITY

## 2017-03-02 RX ADMIN — SODIUM CHLORIDE, SODIUM LACTATE, POTASSIUM CHLORIDE, AND CALCIUM CHLORIDE: .6; .31; .03; .02 INJECTION, SOLUTION INTRAVENOUS at 10:45

## 2017-03-02 RX ADMIN — PROPOFOL 100 MG: 10 INJECTION, EMULSION INTRAVENOUS at 11:19

## 2017-03-07 ENCOUNTER — GENERIC CONVERSION - ENCOUNTER (OUTPATIENT)
Dept: OTHER | Facility: OTHER | Age: 47
End: 2017-03-07

## 2017-03-08 ENCOUNTER — GENERIC CONVERSION - ENCOUNTER (OUTPATIENT)
Dept: OTHER | Facility: OTHER | Age: 47
End: 2017-03-08

## 2017-03-09 ENCOUNTER — HOSPITAL ENCOUNTER (EMERGENCY)
Facility: HOSPITAL | Age: 47
Discharge: HOME | End: 2017-03-09
Attending: EMERGENCY MEDICINE | Admitting: EMERGENCY MEDICINE
Payer: COMMERCIAL

## 2017-03-09 VITALS
DIASTOLIC BLOOD PRESSURE: 82 MMHG | OXYGEN SATURATION: 99 % | HEIGHT: 64 IN | TEMPERATURE: 98 F | BODY MASS INDEX: 28.91 KG/M2 | SYSTOLIC BLOOD PRESSURE: 136 MMHG | WEIGHT: 169.31 LBS | HEART RATE: 95 BPM | RESPIRATION RATE: 16 BRPM

## 2017-03-09 VITALS
OXYGEN SATURATION: 100 % | TEMPERATURE: 98.8 F | RESPIRATION RATE: 18 BRPM | BODY MASS INDEX: 29.4 KG/M2 | HEART RATE: 92 BPM | SYSTOLIC BLOOD PRESSURE: 158 MMHG | WEIGHT: 171.3 LBS | DIASTOLIC BLOOD PRESSURE: 79 MMHG

## 2017-03-09 DIAGNOSIS — F41.9 ANXIETY: Primary | ICD-10-CM

## 2017-03-09 DIAGNOSIS — K20.90 ESOPHAGITIS: Primary | ICD-10-CM

## 2017-03-09 DIAGNOSIS — F41.9 ANXIETY: ICD-10-CM

## 2017-03-09 LAB
ATRIAL RATE: 88 BPM
P AXIS: 58 DEGREES
PR INTERVAL: 136 MS
QRS AXIS: 70 DEGREES
QRSD INTERVAL: 76 MS
QT INTERVAL: 372 MS
QTC INTERVAL: 450 MS
T WAVE AXIS: 57 DEGREES
VENTRICULAR RATE: 88 BPM

## 2017-03-09 PROCEDURE — 99284 EMERGENCY DEPT VISIT MOD MDM: CPT

## 2017-03-09 PROCEDURE — 99283 EMERGENCY DEPT VISIT LOW MDM: CPT

## 2017-03-09 PROCEDURE — 93005 ELECTROCARDIOGRAM TRACING: CPT

## 2017-03-09 RX ORDER — RANITIDINE 150 MG/1
150 CAPSULE ORAL 2 TIMES DAILY
Qty: 60 CAPSULE | Refills: 0 | Status: SHIPPED | OUTPATIENT
Start: 2017-03-09 | End: 2017-04-08

## 2017-05-22 ENCOUNTER — ALLSCRIPTS OFFICE VISIT (OUTPATIENT)
Dept: OTHER | Facility: OTHER | Age: 47
End: 2017-05-22

## 2017-06-01 ENCOUNTER — GENERIC CONVERSION - ENCOUNTER (OUTPATIENT)
Dept: OTHER | Facility: OTHER | Age: 47
End: 2017-06-01

## 2018-01-10 NOTE — RESULT NOTES
Verified Results  (1) TISSUE EXAM [id number] 11:19AM [facility]     Test Name Result Flag Reference   LAB AP CASE REPORT (Report)     Surgical Pathology Report             Case: [id number]                   Authorizing Provider: Marlea Sacks, MD  Collected:      03/02/2017 1119        Ordering Location:   [address] [place] [address] Received:      03/02/2017 4769                    Operating Room                                 Pathologist:      Jeffrey Mas DO                               Specimens:  A) - Duodenum, Cold bx r/o C  sprue                                  B) - Stomach, Cold bx, r/o H  pylori   LAB AP FINAL DIAGNOSIS (Report)     A  Duodenum, biopsy:  - Duodenal mucosa with no significant pathologic abnormalities  - No villous atrophy or increased intraepithelial lymphocytes identified  B  Stomach, biopsy:  - Chronic inactive antral gastritis  - No H  pylori organisms identified on H&E and immunohistochemical stains  Appropriate positive and negative controls obtained  Interpretation performed at [facility]  Electronically signed by Jeffrey Mas DO on 3/6/2017 at 2:14 PM   LAB AP SURGICAL ADDITIONAL INFORMATION (Report)     These tests were developed and their performance characteristics   determined by [facility] or [facility]  They may not be cleared or approved by the U S  Food and   Drug Administration  The FDA has determined that such clearance or   approval is not necessary  These tests are used for clinical purposes  They should not be regarded as investigational or for research  This   laboratory has been approved by CLIA 88, designated as a high-complexity   laboratory and is qualified to perform these tests  LAB AP GROSS DESCRIPTION (Report)     A   The specimen is received in formalin, labeled with the patient's name   and hospital number, and is designated duodenum biopsy, rule out celiac   sprue  The specimen consists of 3 tan-white soft tissue fragments ranging   in size from 0 2-0 3 cm in greatest dimension  Entirely submitted  One   cassette  B  The specimen is received in formalin, labeled with the patient's name   and hospital number, and is designated Stomach biopsy, rule out H    Pylori  The specimen consists of 2 tan-white soft tissue fragments each   averaging 0 3 cm in greatest dimension  Entirely submitted  One cassette  The estimated total formalin fixation time based on collection information   is 26 75 hours      SLC   LAB AP CLINICAL INFORMATION Cold bx r/o c sprue     Cold bx r/o c sprue

## 2018-01-12 VITALS
WEIGHT: 169.01 LBS | DIASTOLIC BLOOD PRESSURE: 82 MMHG | SYSTOLIC BLOOD PRESSURE: 122 MMHG | BODY MASS INDEX: 29.01 KG/M2

## 2018-01-12 VITALS
BODY MASS INDEX: 28.17 KG/M2 | SYSTOLIC BLOOD PRESSURE: 114 MMHG | HEIGHT: 64 IN | WEIGHT: 165 LBS | DIASTOLIC BLOOD PRESSURE: 78 MMHG | HEART RATE: 121 BPM

## 2018-01-12 VITALS
SYSTOLIC BLOOD PRESSURE: 156 MMHG | HEART RATE: 122 BPM | TEMPERATURE: 97.7 F | DIASTOLIC BLOOD PRESSURE: 76 MMHG | RESPIRATION RATE: 16 BRPM

## 2018-01-12 VITALS
BODY MASS INDEX: 28.53 KG/M2 | DIASTOLIC BLOOD PRESSURE: 74 MMHG | HEIGHT: 64 IN | SYSTOLIC BLOOD PRESSURE: 110 MMHG | OXYGEN SATURATION: 98 % | WEIGHT: 167.13 LBS | HEART RATE: 84 BPM

## 2018-01-12 NOTE — MISCELLANEOUS
Message   Date: 12 Feb 2017 11:14 AM EST, Recorded By: Jaspreet Cates For: Deidra PeabodyEnos Ochoa, Self   Phone: [phone] (Home)   Reason: Other   Answering service 2/12/2017 11:14 am   Pt  needs to be admitted needs on-call doctor     Message Free Text Note Form: c/o chest pain from shingles couldn't breath- recommended ER      Signatures   Electronically signed by : JOSE MIGUEL Phelps; Feb 13 2017 10:42AM EST                       (Author)

## 2018-01-13 VITALS
WEIGHT: 163.25 LBS | RESPIRATION RATE: 18 BRPM | SYSTOLIC BLOOD PRESSURE: 126 MMHG | DIASTOLIC BLOOD PRESSURE: 76 MMHG | BODY MASS INDEX: 28.02 KG/M2 | HEART RATE: 96 BPM

## 2018-01-14 VITALS
HEART RATE: 88 BPM | HEIGHT: 64 IN | DIASTOLIC BLOOD PRESSURE: 90 MMHG | BODY MASS INDEX: 28.17 KG/M2 | SYSTOLIC BLOOD PRESSURE: 120 MMHG | WEIGHT: 165 LBS

## 2018-01-14 VITALS
HEART RATE: 88 BPM | DIASTOLIC BLOOD PRESSURE: 80 MMHG | TEMPERATURE: 97.2 F | BODY MASS INDEX: 29.02 KG/M2 | WEIGHT: 170 LBS | SYSTOLIC BLOOD PRESSURE: 112 MMHG | HEIGHT: 64 IN

## 2018-01-14 VITALS
BODY MASS INDEX: 28.56 KG/M2 | OXYGEN SATURATION: 94 % | SYSTOLIC BLOOD PRESSURE: 128 MMHG | HEART RATE: 107 BPM | TEMPERATURE: 98.1 F | DIASTOLIC BLOOD PRESSURE: 72 MMHG | WEIGHT: 166.38 LBS

## 2018-01-16 NOTE — MISCELLANEOUS
Message   Date: 18 Feb 2017 4:13 PM EST, Recorded By: Cirilo Benavides For: Bryan Wilcox   Caller: Fatmata Falcon, Self   Phone: [phone] (Home)   Reason: Medical Complaint   Answering service 2/18/2017 4:13 pm  She feels she has hyperkalcemia  She has been eating a great amount of yogurt to get rid of thrush  She says body has been spasming, having difficulty breathing and can only drink coke right now  She has no appetite  She has had 12 ER visits and during 10 she had sodium chloride given to her  Date: 18 Feb 2017 4:13 PM EST, Recorded By: Cirilo Benavides For: Arnaldo Morejon Reading, Self   Phone: [phone] (Home)   Reason: Medical Complaint      Date: 18 Feb 2017 4:13 PM EST, Recorded By: Cirilo Benavides For: Bryan Peng Reading, Self   Phone: [phone] (Home)   Reason: Medical Complaint   Answering service 2/18/2017 4:13 pm   She feels she has hypercalcemia  She has been eating a great amount of yogurt to get rid of thrush    She has body spasms and difficulty breathing  She can only drink coke right now  She has no appetite  She has been to the ER 12 times and during 10 she had sodium chloride given to her  Date: 18 Feb 2017 4:13 PM EST, Recorded By: Cirilo Benavides For: Jordin Sky   Caller: Fatmata Falcon, Self   Phone: [phone] (Home)   Reason: Medical Complaint   Answering service 2/18/2017 4:13 pm   She feels she has hypercalcemia  She has been eating a great amount of yogurt for thrush  She has body spasms and difficulty breathing  She can only drink coke right now  Has no appetite she has had 12 visits to the ER and during 10 she had sodium chloride given to her  Discussion/Summary  Discussion Summary: This patient called twice on February 20  Both times she complained of feeling tingling all over  She went on about having been told that she had low calcium at one point   She called again at 4:30 in the morning  It was difficult to tell over the phone what her issues were  I told her that should if she felt that she needed to she should return to the emergency room for evaluation  After that she should come in and see her primary care physician on Monday for reevaluation        Signatures   Electronically signed by : Cuco Ibarra DO; Feb 24 2017  1:35PM EST                       (Author)

## 2018-01-17 NOTE — RESULT NOTES
Verified Results  (B) PAP WITH HPV PLUS [id number] 12:15PM [facility]     Test Name Result Flag Reference   PAP, LIQUID-BASED NILM     DIAGNOSIS:            Negative for intraepithelial lesion or malignancy  ADEQUACY:             Satisfactory for evaluation / No endocervical/                         transformation zone component present, consistent                         with status-post hysterectomy  COMMENT:              This Pap smear was screened with the assistance                         of the MedivancePrep(TM) Imaging System and                         screened by a cytotechnologist   SPECIMEN SOURCE:      LIQUID-BASED PAP + HPV PLUS, VAGINAL  CLINICAL INFORMATION: LMP: N/A                        Hysterectomy                        Provided Diagnosis Codes: Z11 3,Z01 419,Z11 51                                                Cervicovaginal cytology should be considered a                         screening procedure subject to false negatives                         and false positives  Results are more reliable                         when a satisfactory sample is obtained on a                         regular repetitive basis, and should be                         interpreted together with past and current                         clinical data  ELECTRONICALLY SIGNED   BY:                   Rescreened By: ISAURO Moreland (ASCP)   Case                         Electronically Signed 02/25/2017                          CASE COMMENTS:        The specificity and positive predictive value of                         high risk HPV for LUKE 2+ on biopsy varies                         according to different studies; in one study it                         is approximately 30-40%, depending on the                         population iftikhar Blum, Cancer                         Epidem[employer]ark[employer], Nov  2008)                             Therefore, cytology may be negative in the setting of HPV infection  Follow up as per ASCCP                         guidelines  HPV HR (NON 16/18) Detected A    HPV 18+ Not Detected     HPV16+ Not Detected     CHLAMYDIA TRACHOMATIS BY MULTIPLEX PCR (1,5,6)  GC BY MULTIPLEX PCR (1,5,6)  HPV HR(NON 16/18) (2,4,5,6)  HPV 18+ (2,3,4,5,6)  HPV16+ (2,3,4,5,6)  (1)  This test is an in vitro test for the detection of sexually transmitted   infections (STIs) in clinical specimens  The test utilizes   amplification of target DNA by the Polymerase Chain Reaction (PCR)   based on dual priming oligonucleotide technology and detects STI DNA  (2)  The mejia(R) HPV test is FDA-cleared for ThinPrep(R) specimens and   detects genomic HPV DNA in the polymorphic L1 region in 14 subtypes:   Type 16, Type 18, and other high risk types   (49,96,98,17,89,54,61,21,77,86,95,07)  The test has been modified and   validated for use in SurePath(TM) specimens  (3)  HPV types 16 and/or 18 that were Not Detected were undetectable or   below the pre-set threshold  (4)  The non-repeat rate for HPV genotyping assays varies from 5 to 15%  In   the NILM cytology category, there is a low positive predictive value   (PPV = 15-20%) for CIN2+ with a positive high risk HPV result  (5)  This test was evaluated and its performance characteristics determined   by [facility]  It has not been cleared or approved by   the U S  Food and Drug Administration  The FDA has determined that   such clearance or approval is not necessary  [employer]   is certified under the Clinical Laboratory Improvement Act of 1988   (CLIA) as qualified to perform high complexity clinical testing  (6)  Results should be interpreted together with past and current clinical   and laboratory data

## 2018-01-17 NOTE — RESULT NOTES
Verified Results  (1) TISSUE EXAM [id number] 11:19AM [facility]     Test Name Result Flag Reference   LAB AP CASE REPORT (Report)     Surgical Pathology Report             Case: [id number]                   Authorizing Provider: Maya Smith MD  Collected:      03/02/2017 1119        Ordering Location:   [facility] Received:      03/02/2017 1509                    Operating Room                                 Pathologist:      Nikko Garcia DO                               Specimens:  A) - Duodenum, Cold bx r/o C  sprue                                  B) - Stomach, Cold bx, r/o H  pylori   LAB AP FINAL DIAGNOSIS (Report)     A  Duodenum, biopsy:  - Duodenal mucosa with no significant pathologic abnormalities  - No villous atrophy or increased intraepithelial lymphocytes identified  B  Stomach, biopsy:  - Chronic inactive antral gastritis  - No H  pylori organisms identified on H&E and immunohistochemical stains  Appropriate positive and negative controls obtained  Interpretation performed at [facility]  Electronically signed by Nikko Garcia DO on 3/6/2017 at 2:14 PM   LAB AP SURGICAL ADDITIONAL INFORMATION (Report)     These tests were developed and their performance characteristics   determined by [facility] or [facility]  They may not be cleared or approved by the U S  Food and   Drug Administration  The FDA has determined that such clearance or   approval is not necessary  These tests are used for clinical purposes  They should not be regarded as investigational or for research  This   laboratory has been approved by CLIA 88, designated as a high-complexity   laboratory and is qualified to perform these tests  LAB AP GROSS DESCRIPTION (Report)     A   The specimen is received in formalin, labeled with the patient's name   and hospital number, and is designated duodenum biopsy, rule out celiac   sprue  The specimen consists of 3 tan-white soft tissue fragments ranging   in size from 0 2-0 3 cm in greatest dimension  Entirely submitted  One   cassette  B  The specimen is received in formalin, labeled with the patient's name   and hospital number, and is designated Stomach biopsy, rule out H    Pylori  The specimen consists of 2 tan-white soft tissue fragments each   averaging 0 3 cm in greatest dimension  Entirely submitted  One cassette  The estimated total formalin fixation time based on collection information   is 26 75 hours  [address]   LAB AP CLINICAL INFORMATION Cold bx r/o c sprue     Cold bx r/o c sprue   LAB AP ADDENDUM 1      B: Additional immunostain for pancytokeratin AE1/3 is negative for   aberrant expression    Addendum electronically signed by Ame Branch DO on 3/7/2017 at 1:37 PM

## 2018-01-22 VITALS
DIASTOLIC BLOOD PRESSURE: 80 MMHG | BODY MASS INDEX: 28.04 KG/M2 | HEART RATE: 104 BPM | SYSTOLIC BLOOD PRESSURE: 120 MMHG | HEIGHT: 64 IN | OXYGEN SATURATION: 95 % | TEMPERATURE: 98.3 F | WEIGHT: 164.25 LBS

## 2018-07-31 PROBLEM — F32.1 CURRENT MODERATE EPISODE OF MAJOR DEPRESSIVE DISORDER WITHOUT PRIOR EPISODE (HCC): Status: ACTIVE | Noted: 2018-07-31

## 2018-07-31 PROBLEM — L60.8 DISCOLORATION OF NAIL: Status: ACTIVE | Noted: 2018-07-31

## 2018-07-31 NOTE — PROGRESS NOTES
New patient to me as she has not been seen here in approximately 6 years. She is had moved to the [place] [address] and then [place]. She now moved back here in the last 2 months to care for her mother who suffering from Alzheimer's dementia as well as an aunt. LIGATION  MEDICATIONS:    Current Outpatient Prescriptions:  diazepam 10 MG Oral Tab Take 1 tablet (10 mg total) by mouth every 12 (twelve) hours as needed for Anxiety.  Disp: 30 tablet Rfl: 0   escitalopram 10 MG Oral Tab Take 1 tablet (10 mg total) by alexx etiology uncertain    Plans: Refilled citalopram 10 mg #30 with 2 refills. #2 refilled diazepam No. 30 with no refills. To be taken as needed. #3 referral to dermatology for evaluation of the toenail.   I discussed she may need it toenail removal.  Does

## 2018-09-11 ENCOUNTER — TELEPHONE (OUTPATIENT)
Dept: FAMILY MEDICINE CLINIC | Facility: CLINIC | Age: 48
End: 2018-09-11

## 2018-09-11 NOTE — TELEPHONE ENCOUNTER
LMOM for pt to return call. If pt is experiencing SOB, facial swelling to seek immediate evaluation at [facility] or ED.

## 2018-09-21 DIAGNOSIS — F32.1 CURRENT MODERATE EPISODE OF MAJOR DEPRESSIVE DISORDER WITHOUT PRIOR EPISODE (HCC): ICD-10-CM

## 2018-09-21 RX ORDER — DIAZEPAM 10 MG/1
10 TABLET ORAL EVERY 12 HOURS PRN
Qty: 30 TABLET | Refills: 0 | Status: ON HOLD
Start: 2018-09-21 | End: 2018-10-29

## 2018-09-21 NOTE — TELEPHONE ENCOUNTER
Dr. Concepcion Martinez,  See below.      Last refill  7/31/18 #30    Last o.v.   7/31/18    Medication pended

## 2018-09-24 DIAGNOSIS — F32.1 CURRENT MODERATE EPISODE OF MAJOR DEPRESSIVE DISORDER WITHOUT PRIOR EPISODE (HCC): ICD-10-CM

## 2018-09-24 RX ORDER — DIAZEPAM 10 MG/1
TABLET ORAL
Qty: 30 TABLET | Refills: 0 | OUTPATIENT
Start: 2018-09-24

## 2018-09-24 NOTE — TELEPHONE ENCOUNTER
Pt's Diazepam refill/approval was sent to the [address] [place] [address], pt no longer uses [facility] and is asking if we can please resend approval to the [facility]. Pt is also requesting a new prescription for trintellix medication. Please call and advise.

## 2018-10-14 PROBLEM — F10.930 ALCOHOL WITHDRAWAL SYNDROME WITHOUT COMPLICATION (HCC): Status: ACTIVE | Noted: 2018-10-14

## 2018-10-14 PROBLEM — F10.230 ALCOHOL WITHDRAWAL SYNDROME WITHOUT COMPLICATION (HCC): Status: ACTIVE | Noted: 2018-10-14

## 2018-10-14 NOTE — ED PROVIDER NOTES
Patient Seen in: [facility] Emergency Department    History   Patient presents with:  Eval-P (psychiatric)    Stated Complaint: wants detox     HPI    57-year-old female presents emergency room requesting alcohol detox.   Patient states that she has Packs/day: 1.00        Years: 20.00        Pack years: 20        Types: Cigarettes      Smokeless tobacco: Never Used    Alcohol use: No    Drug use: No      Comment: quit marijuana june 2010      Review of Systems    Positive for stated complaint: wants d Alkaline Phosphatase 104 (*)     A/G Ratio 0.9 (*)     All other components within normal limits   ETHYL ALCOHOL - Abnormal; Notable for the following components:    Ethyl Alcohol 34 (*)     All other components within normal limits   DRUG SCREEN 7 W/CO fluid hydration and banana bag. Discussed with Dr. Carlita Killian, hospitalist physician.   Patient will require admission for further evaluation treatment of alcohol withdrawal.  Admission disposition: 10/14/2018  8:59 PM                 Disposition and Plan

## 2018-10-14 NOTE — ED INITIAL ASSESSMENT (HPI)
Patient here by herself wanting alcohol detox. Patient states she has been drinking 1 pint of vodka daily since June. She reports her last drink was yesterday.  She states she is going through a divorce and that she had left [place] in June to live and take c

## 2018-10-15 NOTE — PROGRESS NOTES
[facility]  Progress Note    [place] Patient Status:  Inpatient    1970 MRN [id number]   [facility] 3NE-A Attending Dunia Christina MD   Hosp Day # 1 PCP Facundo Cloud MD     CC: Alcohol intoxication    SUBJECTIVE: 134 10/14/2018    CA 7.9 10/14/2018    ALB 3.2 10/14/2018    ALKPHO 104 10/14/2018    BILT 0.6 10/14/2018    TP 6.8 10/14/2018     10/14/2018    ALT  10/14/2018      Comment:      Specimen hemolyzed.  Specimen needs to be recollected for AST and K psych eval  6. Epigastric abdominal pain possibly due to alcoholic gastritis and GERD–PPI  7. Elevated liver function tests due to alcoholic hepatitis– follow liver function  8. Hypokalemia–replace  9. Hypomagnesemia–replaced and improved  10.  Leukocytosis
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
## 2018-10-15 NOTE — CONSULTS
[facility]  Report of Psychiatric Consultation    Donte Cuevasle Patient Status:  Inpatient    1970 MRN [id number]   [facility] 3NE-A Attending Dunia Christina MD   Hosp Day # 1 PCP Facundo Cloud MD     Date of Admission: 1 Illness:  58 yo WF with severe alcohol use disorder was admitted on 10/15/18 for alcohol detox. Her last drink was on 10/14/18. Her BAL in the ED was 34 at 6 PM. She drinks 1 pint of vodka daily on average.  She is guarded and won't tell me much about the f Diagnosis Date   • Acute bronchiolitis due to other infectious organisms    • Anesthesia complication     woke up during surgery x 1   • Anxiety state    • Asthma    • Back problem    • DEPRESSION age 18    hospitalized with suicide attempt with wrist sl smokeless tobacco. She reports that she does not use drugs. Allergies:    Bupropion Hcl               Comment:TABS; seizures  Neurontin [Gabapent*    RESPIRATORY FAILURE    Comment:Throat closes  Pcn [Penicillins]       RESPIRATORY FAILURE    Comment: In minerals (ADULT) tab 1 tablet, 1 tablet, Oral, Daily  •  folic acid (FOLVITE) tab 1 mg, 1 mg, Oral, Daily  •  nicotine (NICODERM CQ) 14 MG/24HR 1 patch, 1 patch, Transdermal, Daily    Review of Systems   Constitutional: Positive for malaise/fatigue.    Psyc

## 2018-10-15 NOTE — PLAN OF CARE
NURSING ADMISSION NOTE      Patient admitted via Cart  Oriented to room. Safety precautions initiated. Bed in low position. Call light in reach. Received patient from ED via cart; awake, alert and oriented.  Noted to have mild tremors in arms and an

## 2018-10-15 NOTE — PROGRESS NOTES
10/15/18 2383   Clinical Encounter Type   Visited With Patient not available   Continue Visiting Yes

## 2018-10-15 NOTE — CM/SW NOTE
MSW went to see patient, she was dressed and leaving. MSW provided card and handout to local resources for housing.

## 2018-10-15 NOTE — H&P
[facility]  History and Physical     Colleen Bermudez Patient Status:  Emergency    1970 MRN [id number]   Location [address] [facility] Attending Orquidea Kilpatrick, [address] Day # 0 PCP Jennifer Vincent MD     Chief Complaint used smokeless tobacco. She reports that she does not drink alcohol or use drugs.     Family History:   Family History   Problem Relation Age of Onset   • Other (AIDS [Other]) Brother    • Other (Acute MI [Other]) Father    • Asthma Son    • Other (back [place] point review of systems was completed. Pertinent positives and negatives noted in the HPI.     Physical Exam:    /87   Pulse 112   Temp 98.3 °F (36.8 °C) (Oral)   Resp 18   Ht 5' 4\" (1.626 m)   Wt 125 lb (56.7 kg)   SpO2 98%   BMI 21.46 kg/m²   Ge in am   4. Dehydration - IVF  5. Hypomagnesemia- replace   6. Depression - psych eval  7. Elevated liver enzymes due to alcoholic hepatitis. 1. Check Hep panel  2.  CMP in am     Quality:  · DVT Prophylaxis: lovenox  · CODE status:full  · Shannon: none    Pl

## 2018-10-16 ENCOUNTER — TELEPHONE (OUTPATIENT)
Dept: FAMILY MEDICINE CLINIC | Facility: CLINIC | Age: 48
End: 2018-10-16

## 2018-10-16 ENCOUNTER — PATIENT OUTREACH (OUTPATIENT)
Dept: CASE MANAGEMENT | Age: 48
End: 2018-10-16

## 2018-10-16 DIAGNOSIS — Z02.9 ENCOUNTERS FOR ADMINISTRATIVE PURPOSE: ICD-10-CM

## 2018-10-16 NOTE — TELEPHONE ENCOUNTER
JULIO Craig RN   Caller: Unspecified (Today,  2:12 PM)             Pt refused an ambulance when I had offered to call one as pt does not want to be taken to [facility] near her home which is where they would take her according to pt.  Marifer

## 2018-10-16 NOTE — DISCHARGE SUMMARY
[facility]  Discharge Summary    [address] [facility] Patient Status:  Inpatient    1970 MRN [id number]   [facility] 3NE-A Attending No att. providers found   Hosp Day # 1 PCP Bharati Red MD     Date of Admission: 10/14/2018 sinus tachycardia due to dehydration on admission improved with IV fluids. Hypomagnesemia in the admission replace. Elevated liver function tests due to alcohol hepatitis. Hepatitis ABC panel negative.   Patient seen by psychiatrist for depression and al m)   Wt 129 lb 3.2 oz (58.6 kg)   SpO2 100%   BMI 22.18 kg/m²       General appearance: alert and cooperative  Head: Normocephalic, without obvious abnormality, atraumatic  Throat: oral mucosa moist  Neck: no adenopathy, no carotid bruit, no JVD  Lungs: cl

## 2018-10-16 NOTE — TELEPHONE ENCOUNTER
Spoke with pt today for TCM and condition update. Pt was at [facility] on 10/14/18 and admitted however left AMA as of 10/15/18 w/ dx alcohol intoxication and depression.   Pt states she left because she was worried about her mother who has dementia whom sh

## 2018-10-16 NOTE — PROGRESS NOTES
Initial Post Discharge Follow Up   Discharge Date: 10/15/18  Contact Date: 10/16/2018    Consent Verification:  Assessment Completed With: Patient  HIPAA Verified?   Yes    Discharge Dx:   Alcohol intoxication, depression    General:   • How have you bee Medications:  sucralfate 1 g Oral Tab Take 1 g by mouth 3 (three) times daily before meals. Disp:  Rfl:    Multiple Vitamin (MULTI-VITAMIN DAILY) Oral Tab Take 1 tablet by mouth daily.  Disp:  Rfl:    Cholecalciferol (VITAMIN D) 2000 units Oral Cap Take 2,0 TCM/HFU appointment: scheduled at D/C within 7-14 days  yes     NCM Reviewed/scheduled/rescheduled PCP TCM/HFU appointment with pt:  Yes      Have you made all of your follow up appointments?  yes    Is there any reason as to why you cannot make your appoin

## 2018-10-26 PROBLEM — F10.20 ALCOHOL USE DISORDER, MODERATE, DEPENDENCE (HCC): Status: ACTIVE | Noted: 2018-10-26
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
## 2018-10-26 NOTE — ED INITIAL ASSESSMENT (HPI)
Patient states she drove herself to this ED to get back on her treatment for alcohol detox. Patient reports she left AMA 2 days ago, admitted for ETOH detox. She states she had to leave b/c she needed someone to take care of her mother at home.  Patient rep

## 2018-10-27 PROBLEM — F10.20 ALCOHOL USE DISORDER, SEVERE, DEPENDENCE (HCC): Status: ACTIVE | Noted: 2018-10-26

## 2018-10-27 NOTE — ED NOTES
Report called to [facility] per previous RN. Per Dr Nitza Odom pt okay to be discharged and then to [facility] voluntarily. Pt agreeable.

## 2018-10-27 NOTE — PAYOR COMM NOTE
--------------  DISCHARGE REVIEW    Payor: [facility]  Subscriber #:  [id number]  Authorization Number: [id number]    Admit date: 10/14/18  Admit time:  2306  Discharge Date: 10/15/2018  5:12 PM     Admitting Physician: Pamela Lilly MD  Atte Vanda Triplett is a 57year old female with depression who has been under a lot of stress including a divorce, moved to [place] from [place] to take care of her mother with dementia. She has been heavily drinking. She drinks daily at least a pint of vodka a day.  David La VALIUM      Take 1 tablet (10 mg total) by mouth every 12 (twelve) hours as needed for Anxiety. Quantity:  30 tablet  Refills:  0     MULTI-VITAMIN DAILY Tabs      Take 1 tablet by mouth daily.    Refills:  0     sucralfate 1 g Tabs  Commonly known as:  C
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
## 2018-11-05 ENCOUNTER — TELEPHONE (OUTPATIENT)
Dept: FAMILY MEDICINE CLINIC | Facility: CLINIC | Age: 48
End: 2018-11-05

## 2018-11-05 NOTE — TELEPHONE ENCOUNTER
Patient was seen at [address] [place] [address] this am.  She had an abnormal heart rate and refuses to go to the ER. Heart Rate 128  Again 121    Patient was told to follow up with her PCP. Patient stated she would go get some Gatorade and that always helps her.     P

## 2018-11-07 ENCOUNTER — OFFICE VISIT (OUTPATIENT)
Dept: FAMILY MEDICINE CLINIC | Facility: CLINIC | Age: 48
End: 2018-11-07
Payer: COMMERCIAL

## 2018-11-07 VITALS
TEMPERATURE: 99 F | BODY MASS INDEX: 21 KG/M2 | WEIGHT: 127 LBS | DIASTOLIC BLOOD PRESSURE: 86 MMHG | SYSTOLIC BLOOD PRESSURE: 130 MMHG | OXYGEN SATURATION: 98 % | HEART RATE: 123 BPM

## 2018-11-07 DIAGNOSIS — F10.10 ALCOHOL ABUSE: ICD-10-CM

## 2018-11-07 DIAGNOSIS — F41.9 ANXIETY: ICD-10-CM

## 2018-11-07 DIAGNOSIS — F32.A DEPRESSION, UNSPECIFIED DEPRESSION TYPE: ICD-10-CM

## 2018-11-07 DIAGNOSIS — R11.0 NAUSEA: ICD-10-CM

## 2018-11-07 DIAGNOSIS — E03.9 HYPOTHYROIDISM, UNSPECIFIED TYPE: Primary | ICD-10-CM

## 2018-11-07 PROCEDURE — 1111F DSCHRG MED/CURRENT MED MERGE: CPT | Performed by: FAMILY MEDICINE

## 2018-11-07 PROCEDURE — 99214 OFFICE O/P EST MOD 30 MIN: CPT | Performed by: FAMILY MEDICINE

## 2018-11-07 RX ORDER — DIAZEPAM 5 MG/1
5 TABLET ORAL EVERY 12 HOURS PRN
Qty: 20 TABLET | Refills: 0 | Status: SHIPPED | OUTPATIENT
Start: 2018-11-07 | End: 2018-11-21

## 2018-11-07 RX ORDER — LEVOTHYROXINE SODIUM 0.05 MG/1
50 TABLET ORAL
Qty: 30 TABLET | Refills: 3 | Status: SHIPPED | OUTPATIENT
Start: 2018-11-23 | End: 2018-11-21

## 2018-11-07 RX ORDER — DIAZEPAM 10 MG/1
10 TABLET ORAL EVERY 12 HOURS PRN
COMMUNITY
End: 2018-11-07

## 2018-11-07 RX ORDER — ONDANSETRON 4 MG/1
4 TABLET, FILM COATED ORAL EVERY 8 HOURS PRN
Qty: 40 TABLET | Refills: 0 | Status: SHIPPED | OUTPATIENT
Start: 2018-11-07 | End: 2018-11-16

## 2018-11-07 NOTE — PROGRESS NOTES
HPI:    Patient ID: Colleen Bermudez is a 57year old female. Pt has a hx of alcohol abuse. She went to the ER on 10/26 to be put on an alcohol detox treatment.  Pt states before going to the ER she wentto go take care of her mother, relapsed, and st Hcl           Tightness in Throat    Comment:TABS; seizures  (Wellbutrin)  Neurontin [Gabapent*    RESPIRATORY FAILURE    Comment:Throat closes  Pcn [Penicillins]       RESPIRATORY FAILURE    Comment:Ineffective also  Tramadol                HIVES    Comme Levothyroxine Sodium 50 MCG Oral Tab; Take 1 tablet (50 mcg total) by mouth before breakfast.  Dispense: 30 tablet; Refill: 3  - TSH W REFLEX TO FREE T4; Future    2. Depression, unspecified depression type  - Vortioxetine HBr 10 MG Oral Tab;  Take 1 tablet

## 2018-11-15 ENCOUNTER — TELEPHONE (OUTPATIENT)
Dept: FAMILY MEDICINE CLINIC | Facility: CLINIC | Age: 48
End: 2018-11-15

## 2018-11-15 DIAGNOSIS — R11.0 NAUSEA: ICD-10-CM

## 2018-11-15 NOTE — TELEPHONE ENCOUNTER
Dr. Tatyana Kemp,   see refill request below. rx was filled 11/7/18. Patient using qid. Still nauseated asking for a refill. please advise

## 2018-11-16 ENCOUNTER — TELEPHONE (OUTPATIENT)
Dept: FAMILY MEDICINE CLINIC | Facility: CLINIC | Age: 48
End: 2018-11-16

## 2018-11-16 DIAGNOSIS — R11.0 NAUSEA: ICD-10-CM

## 2018-11-16 RX ORDER — ONDANSETRON 4 MG/1
4 TABLET, FILM COATED ORAL EVERY 8 HOURS PRN
Qty: 40 TABLET | Refills: 0 | Status: SHIPPED | OUTPATIENT
Start: 2018-11-16 | End: 2019-04-09

## 2018-11-16 NOTE — TELEPHONE ENCOUNTER
PT SPOKE TO BRENDA YESTERDAY ABOUT AND ANTI NAUSEA MED. SHE NEEDS IT GP'V. SHE STILL HAS  NOT HEARD ANYTHING ABOUT THE REFILL. [facility]  ON [facility].

## 2018-11-16 NOTE — TELEPHONE ENCOUNTER
No more refills after this. Needs to see GI for this vomiting. Future refills only through GI.    STOP DRINKING ALCOHOL FOREVER.

## 2018-11-21 PROBLEM — F10.930 ALCOHOL WITHDRAWAL SYNDROME WITHOUT COMPLICATION (HCC): Status: RESOLVED | Noted: 2018-10-14 | Resolved: 2018-11-21

## 2018-11-21 PROBLEM — F10.230 ALCOHOL WITHDRAWAL SYNDROME WITHOUT COMPLICATION (HCC): Status: RESOLVED | Noted: 2018-10-14 | Resolved: 2018-11-21

## 2018-11-21 NOTE — PATIENT INSTRUCTIONS
[address] [place] [address]'s SCREENING SCHEDULE   Tests on this list are recommended by your physician but may not be covered, or covered at this frequency, by your insurer. Please check with your insurance carrier before scheduling to verify coverage.    Rogelio Tineo for this patient. Update Health Maintenance if applicable    Flex Sigmoidoscopy Screen  Covered every 5 years No results found for this or any previous visit. No flowsheet data found.      Fecal Occult Blood   Covered Annually No results found for: FOB, OCC 65th birthday    Hepatitis B for Moderate/High Risk       No orders found for this or any previous visit.  Medium/high risk factors:   End-stage renal disease   Hemophiliacs who received Factor VIII or IX concentrates   Clients of institutions for the [facility]

## 2018-11-21 NOTE — PROGRESS NOTES
HPI:   Shruti High is a 57year old female who presents for a Medicare Subsequent Annual Wellness visit (Pt already had Initial Annual Wellness). Hospitalized at [facility] with alcohol intoxication.   She admits to having started drinking ag functional status. Driving: Need some help   She has Meal Preparation difficulties based on screening of functional status. Preparing your meals: Need some help  She has difficulties Managing Money/Bills based on screening of functional status.    Manag She currently smokes tobacco.  Social History    Tobacco Use      Smoking status: Current Every Day Smoker        Packs/day: 1.00        Years: 20.00        Pack years: 20        Types: Cigarettes      Smokeless tobacco: Never Used     This is a tobacco hcl].    CURRENT MEDICATIONS:     Outpatient Medications Marked as Taking for the 11/21/18 encounter (Office Visit) with Brandie Callejas MD:  diazepam 5 MG Oral Tab Take 1 tablet (5 mg total) by mouth every 12 (twelve) hours as needed.    [START ON 11/23/2 surgical history that includes tubal ligation (89); hysterectomy (age 31); other surgical history (2003); other surgical history (2008); other surgical history (2008); other surgical history (2009); colonoscopy (2009); colonoscopy (2010); colonoscopy; chol visit:    Panic disorder without agoraphobia  See below with anxiety. Sinus tachycardia  Persistent.   Workup performed in the hospital.  Alcohol use disorder, severe, dependence ([facility] [address]  I have prescribed Valium 5 mg twice a day to help her cut down her alc does the patient maintain a good energy level?: Other  How would you describe your daily physical activity?: None  How would you describe your current health state?: Poor      This section provided for quick review of chart, separate sheet to patient  PREV Please get every year    Pneumococcal 13 (Prevnar)  Covered Once after 65 No vaccine history found Please get once after your 65th birthday    Pneumococcal 23 (Pneumovax)  Covered Once after 65 No vaccine history found Please get once after your 65th birth

## 2018-12-03 ENCOUNTER — TELEPHONE (OUTPATIENT)
Dept: FAMILY MEDICINE CLINIC | Facility: CLINIC | Age: 48
End: 2018-12-03

## 2018-12-03 NOTE — TELEPHONE ENCOUNTER
Cat from the pharmacy called and would like a PA started for patient for her Zofran. Pharmacy stated that her insurance only will cover 8 pills a month and she wants the entire RX filled. Please advise.

## 2018-12-06 ENCOUNTER — TELEPHONE (OUTPATIENT)
Dept: FAMILY MEDICINE CLINIC | Facility: CLINIC | Age: 48
End: 2018-12-06

## 2018-12-06 NOTE — TELEPHONE ENCOUNTER
Pharmacy calling to see why PA has not been started.   Pharmacist states \"it should have been started already\"

## 2018-12-07 NOTE — TELEPHONE ENCOUNTER
Pharmacy called again checking on the PA, pharmacist mentioned pt is yelling at them because the PA is not done, pharmacist requested to reach out to the pt and explain. Please call pharmacy if you have any questions.

## 2018-12-07 NOTE — TELEPHONE ENCOUNTER
Attempted to do a P. A. For zofran through [employer] and [employer]. Each one stated they do not recognize the patient called pharmacy and they will fax over [address] - they are faxing over form. Received only top sheet.

## 2018-12-24 ENCOUNTER — APPOINTMENT (OUTPATIENT)
Dept: CT IMAGING | Facility: HOSPITAL | Age: 48
DRG: 897 | End: 2018-12-24
Attending: EMERGENCY MEDICINE
Payer: COMMERCIAL

## 2018-12-24 PROBLEM — D69.6 THROMBOCYTOPENIA (HCC): Status: ACTIVE | Noted: 2018-12-24

## 2018-12-24 PROBLEM — F10.930 ALCOHOL WITHDRAWAL SYNDROME WITHOUT COMPLICATION (HCC): Status: ACTIVE | Noted: 2018-12-24

## 2018-12-24 PROBLEM — R73.9 HYPERGLYCEMIA: Status: ACTIVE | Noted: 2018-12-24

## 2018-12-24 PROBLEM — E87.2 METABOLIC ACIDOSIS: Status: ACTIVE | Noted: 2018-12-24

## 2018-12-24 PROBLEM — F10.230 ALCOHOL WITHDRAWAL SYNDROME WITHOUT COMPLICATION (HCC): Status: ACTIVE | Noted: 2018-12-24

## 2018-12-24 PROBLEM — E87.20 METABOLIC ACIDOSIS: Status: ACTIVE | Noted: 2018-12-24

## 2018-12-24 PROBLEM — E86.0 DEHYDRATION: Status: ACTIVE | Noted: 2018-12-24

## 2018-12-24 PROBLEM — R11.11 VOMITING WITHOUT NAUSEA, INTRACTABILITY OF VOMITING NOT SPECIFIED, UNSPECIFIED VOMITING TYPE: Status: ACTIVE | Noted: 2018-12-24

## 2018-12-24 PROBLEM — R74.8 ELEVATED LIVER ENZYMES: Status: ACTIVE | Noted: 2018-12-24

## 2018-12-24 PROCEDURE — 70450 CT HEAD/BRAIN W/O DYE: CPT | Performed by: EMERGENCY MEDICINE

## 2018-12-25 PROBLEM — F10.939 ALCOHOL WITHDRAWAL (HCC): Status: ACTIVE | Noted: 2018-12-25

## 2018-12-25 PROBLEM — F10.239 ALCOHOL WITHDRAWAL (HCC): Status: ACTIVE | Noted: 2018-12-25

## 2018-12-25 NOTE — H&P
THELMA HOSPITALIST  History and Physical     [facility] Patient Status:  Emergency    1970 MRN [id number]   Location [facility] Attending Cindy Garibay MD   Hosp Day # 0 PCP Renetta Cervantes MD     Chief Co History of depression    • History of stomach ulcers    • Hoarseness, chronic    • IBS    • IBS (irritable bowel syndrome)    • Indigestion    • Leaking of urine    • Loss of appetite    • Migraines    • Mouth sores    • Nausea    • Night sweats    • OSTEO (back pain) Sister    • Other (bacterial pneumonia) Paternal Grandmother    • Other (cirrhosis) Paternal Grandfather    • Other (hypothyroidism) Maternal Grandmother    • Other (pancreatic neoplasm) Maternal Grandmother    • Other (stroke syndrome) Materna Oral Tab Take 1 tablet by mouth daily. Disp:  Rfl:    Cholecalciferol (VITAMIN D) 2000 units Oral Cap Take 5,000 Units by mouth daily. Disp:  Rfl:        Review of Systems:   A comprehensive 14 point review of systems was completed.     Pertinent positive d/c  2. Sinus tachycardia, due to above  3. Asthma, stable  4. Depression/anxiety  5. Etoh hepatitis, monitor  6. IBS  7. Hypothyroidism   8. Nicotine dependence  9.  Metabolic acidosis, monitor    Quality:  · DVT Prophylaxis: lovenox  · CODE status: Full

## 2018-12-25 NOTE — ED PROVIDER NOTES
Patient Seen in: [facility] Emergency Department    History   Patient presents with:  Eval-P (psychiatric)    Stated Complaint: ETOH withdrawal     HPI    Patient presents with alcohol withdrawal symptoms.   The patient states that she lost her diaze had her uterus removed in stages, over the course of 3 surgeries. (2001, 2008, 2009).    • HEADACHES     migraine   • Heart palpitations    • Hemorrhoids    • High cholesterol    • History of cholecystectomy    • History of depression    • History of stomac tobacco: Never Used    Alcohol use: Yes      Comment: 1 pint of vodka daily for the past 5-6 days    Drug use: No      Comment: quit marijuana june 2010      Review of Systems    Positive for stated complaint: ETOH withdrawal   Other systems are as noted i Alt 163 (*)     Alkaline Phosphatase 257 (*)     A/G Ratio 0.9 (*)     All other components within normal limits   CBC W/ DIFFERENTIAL - Abnormal; Notable for the following components:    .0 (*)     MCH 35.2 (*)     MCHC 37.3 (*)     All other compo sinuses are unremarkable. CONCLUSION:  Cortical atrophy. No acute process.     Dictated by: Lucina Bobo MD on 12/24/2018 at 20:56     Approved by: Lucina Bobo MD            Medications   LORazepam (ATIVAN) injection 2 mg (2 mg Intravenous Given specified, unspecified vomiting type  Dehydration    Disposition:  Admit  12/24/2018 10:25 pm    Follow-up:  No follow-up provider specified.       Medications Prescribed:  Current Discharge Medication List        Present on Admission  Date Reviewed: 12/24/

## 2018-12-25 NOTE — PROGRESS NOTES
THELMA HOSPITALIST  Progress Note     Lalit Nicole Patient Status:  Inpatient    1970 MRN [id number]   [facility] 3NE-A Attending Kym Victoria MD   Hosp Day # 1 PCP Uriel Boucher MD     Chief Complaint: Tremors    S: Albaro Baer INR  0.93       No results for input(s): TROP, CK in the last 168 hours. Imaging: Imaging data reviewed in Epic.     Medications:   • Levothyroxine Sodium  25 mcg Oral Before breakfast   • enoxaparin  40 mg Subcutaneous Daily   • Potassium Chlorid

## 2018-12-25 NOTE — ED INITIAL ASSESSMENT (HPI)
Pt brought here from [facility] for medical clearance. Pt lost her diazepam last week, so she started to drink again. Pt states she drank 1 pint over the weekend. pts last drink was yesterday morning.

## 2018-12-25 NOTE — PROGRESS NOTES
NURSING DISCHARGE NOTE    Discharged Home via Ambulatory. Accompanied by Support staff  Belongings Taken by patient/family. Reviewed med rec and discharge instructions with pt- verbalized understanding. Removed PIV and continuous monitoring.  Pt lef

## 2018-12-26 ENCOUNTER — PATIENT OUTREACH (OUTPATIENT)
Dept: CASE MANAGEMENT | Age: 48
End: 2018-12-26

## 2018-12-26 NOTE — DISCHARGE SUMMARY
[facility] HOSPITALIST  DISCHARGE SUMMARY     Alonso Black Patient Status:  Inpatient    1970 MRN [id number]   [facility] 3NE-A Attending No att. providers found   [facility] Day # 1 PCP Renetta Cervantes MD     Date of Admission: 20 • no    Incidental or significant findings and recommendations (brief descriptions):  • no    Lab/Test results pending at Discharge:   · no    Consultants:  • no    Discharge Medication List:     Discharge Medications      CONTINUE taking these medicatio
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
## 2018-12-31 ENCOUNTER — TELEPHONE (OUTPATIENT)
Dept: FAMILY MEDICINE CLINIC | Facility: CLINIC | Age: 48
End: 2018-12-31
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
## 2018-12-31 NOTE — ED NOTES
Pt verbalized understanding of plan for follow up. She will be driven to [facility] by security.

## 2018-12-31 NOTE — BH LEVEL OF CARE ASSESSMENT
Level of Care Assessment Note    General Questions  Why are you here?: Pt states \"I am going through a divorce and I feel depressed.  I started drinking again to cope with everything\" Pt denies any SI/HI   Precipitating Events: Pt presented at [facility] ER seek anything to end your life? (lifetime): No  Score -  OV: 0- Low Risk   Describe : Pt denies having any SI/Plan or intent to harm herself   Is your experience of thoughts of dying by suicide: Frightening  Protective Factors: Pt states \" I wanna help out a reported   Appetite Symptoms: No appetite  Unplanned Weight Loss: No  Unplanned Weight Gain: No  History of Eating Disorder: No  Active Eating Disorder: No    IBW Calculations  Weight: 119 lb 0.8 oz  SCOFF Questionnaire  Do you make yourself Sick because y over the past 30 days?: Denies                                              Functional Impairment  Currently Attending School: No  Employment Status: Disabled  Job Issues:  Other (comment)  Concerns/Conflicts with Social Relationships: Yes  Describe Concern behavior: Appropriate to situation    Assessment Summary  Assessment Summary: Pt is a 57a year old female. Pt presented at [facility] ER seeking ETOH detox. Pt states she drinks 3-4 times a week for the last one and a half month.  Each time she drinks a pint of V beds availbale at [facility] at this time. Pt will be given referrals for in network facility for inpatient Etoh detox. Education Provided: Call 911 in an Emergency;[facility] Crisis Line Number;Advised to call if condition worsens; Advised to call with questions  Roberto Smith

## 2018-12-31 NOTE — ED PROVIDER NOTES
Patient was evaluated [facility] and was given outpatient referrals. They said that she could show up tomorrow to [facility] to be admitted if they have an available bed if she chose chooses to go through detox. The patient will be discharged.

## 2018-12-31 NOTE — ED INITIAL ASSESSMENT (HPI)
PT was recently discharged for the same, seen at [facility] copely earlier today for the same sx

## 2019-01-04 ENCOUNTER — HOSPITAL ENCOUNTER (OUTPATIENT)
Dept: MRI IMAGING | Age: 49
Discharge: HOME | End: 2019-01-04
Attending: INTERNAL MEDICINE
Payer: COMMERCIAL

## 2019-01-04 ENCOUNTER — LAB ENCOUNTER (OUTPATIENT)
Dept: LAB | Age: 49
End: 2019-01-04
Attending: INTERNAL MEDICINE
Payer: COMMERCIAL

## 2019-01-04 DIAGNOSIS — R79.89 ELEVATED LFTS: ICD-10-CM

## 2019-01-04 DIAGNOSIS — R10.11 RUQ PAIN: ICD-10-CM

## 2019-01-04 DIAGNOSIS — E03.9 HYPOTHYROIDISM, UNSPECIFIED TYPE: ICD-10-CM

## 2019-01-04 DIAGNOSIS — R79.89 ELEVATED LFTS: Primary | ICD-10-CM

## 2019-01-04 LAB — ANA SCREEN: NEGATIVE

## 2019-01-04 PROCEDURE — 83516 IMMUNOASSAY NONANTIBODY: CPT

## 2019-01-04 PROCEDURE — 82728 ASSAY OF FERRITIN: CPT | Performed by: INTERNAL MEDICINE

## 2019-01-04 PROCEDURE — 82784 ASSAY IGA/IGD/IGG/IGM EACH: CPT | Performed by: INTERNAL MEDICINE

## 2019-01-04 PROCEDURE — 86706 HEP B SURFACE ANTIBODY: CPT | Performed by: INTERNAL MEDICINE

## 2019-01-04 PROCEDURE — 36415 COLL VENOUS BLD VENIPUNCTURE: CPT | Performed by: INTERNAL MEDICINE

## 2019-01-04 PROCEDURE — 83516 IMMUNOASSAY NONANTIBODY: CPT | Performed by: INTERNAL MEDICINE

## 2019-01-04 PROCEDURE — 83690 ASSAY OF LIPASE: CPT | Performed by: INTERNAL MEDICINE

## 2019-01-04 PROCEDURE — 82104 ALPHA-1-ANTITRYPSIN PHENO: CPT

## 2019-01-04 PROCEDURE — 85610 PROTHROMBIN TIME: CPT | Performed by: INTERNAL MEDICINE

## 2019-01-04 PROCEDURE — 86256 FLUORESCENT ANTIBODY TITER: CPT

## 2019-01-04 PROCEDURE — 82103 ALPHA-1-ANTITRYPSIN TOTAL: CPT

## 2019-01-04 PROCEDURE — 86708 HEPATITIS A ANTIBODY: CPT | Performed by: INTERNAL MEDICINE

## 2019-01-04 PROCEDURE — 82390 ASSAY OF CERULOPLASMIN: CPT | Performed by: INTERNAL MEDICINE

## 2019-01-04 PROCEDURE — 76376 3D RENDER W/INTRP POSTPROCES: CPT | Performed by: INTERNAL MEDICINE

## 2019-01-04 PROCEDURE — 83550 IRON BINDING TEST: CPT | Performed by: INTERNAL MEDICINE

## 2019-01-04 PROCEDURE — 86038 ANTINUCLEAR ANTIBODIES: CPT

## 2019-01-04 PROCEDURE — 74181 MRI ABDOMEN W/O CONTRAST: CPT | Performed by: INTERNAL MEDICINE

## 2019-01-04 PROCEDURE — 83540 ASSAY OF IRON: CPT | Performed by: INTERNAL MEDICINE

## 2019-01-04 NOTE — PAYOR COMM NOTE
--------------  DISCHARGE REVIEW    Payor: [facility]  Subscriber #:  [id number]  Authorization Number: [id number]     Admit date: 12/24/18  Admit time:  2348  Discharge Date: 12/25/2018  4:58 PM     Admitting Physician: Candice Hartman MD  Attbaljinder she was asymptomatic. D/C home. Lace+ Score: 77  59-90 High Risk  29-58 Medium Risk  0-28   Low Risk. TCM Follow-Up Recommendation:  LACE > 58:  High Risk of readmission after discharge from the hospital.  **Certification  Admission date was 12/24/20 Soft, nontender, nondistended. Positive bowel sounds. No rebound or guarding. Neurologic: No focal neurological deficits. Musculoskeletal: Moves all extremities. Extremities: No edema.   -----------------------------------------------------------------

## 2019-01-05 LAB
F-ACTIN (SMOOTH MUSCLE) AB: 56 UNITS
MITOCHONDRIAL M2 AB, IGG: 3.1 UNITS

## 2019-01-06 LAB — ALPHA-1-ANTITRYPSIN: 164 MG/DL

## 2019-01-10 ENCOUNTER — TELEPHONE (OUTPATIENT)
Dept: INTERNAL MEDICINE CLINIC | Facility: CLINIC | Age: 49
End: 2019-01-10

## 2019-01-18 ENCOUNTER — TELEPHONE (OUTPATIENT)
Dept: FAMILY MEDICINE CLINIC | Facility: CLINIC | Age: 49
End: 2019-01-18

## 2019-01-18 RX ORDER — LEVOTHYROXINE SODIUM 0.05 MG/1
50 TABLET ORAL
Qty: 90 TABLET | Refills: 0 | Status: SHIPPED | OUTPATIENT
Start: 2019-01-18 | End: 2019-01-22

## 2019-01-18 NOTE — TELEPHONE ENCOUNTER
Called and spoke with  at [address], she states they do not arrange this and transferred me to [facility] as they arrange this.  Spoke to the Referral department there, advised pt needs to go there herself to initiate intake, they do not initiate

## 2019-01-18 NOTE — TELEPHONE ENCOUNTER
Per YP OV 11-7-18 YP increased Levothyroxine 50mcg due to elevated TSH. Cynthia Mcdonald Friend called ambulance for pt yesterday, pt went to [facility]. Pt states, \"they did nothing for me\".   Pt states she is drinking as \"I shake when I do not drink, I shake w

## 2019-01-18 NOTE — TELEPHONE ENCOUNTER
She was threatening to drink herself to death yesterday and the er sent her home? They must have deemed her safe for discharge. I have not seen her since October when we prescribed ativan to help decrease the drinking. Obviously that isn't working.   Let

## 2019-01-18 NOTE — TELEPHONE ENCOUNTER
Patient stated that she was at [facility] and was due to her lab results she was told to double her medication  She is now taking 50mcg. She stated she talked to a nurse here and that we never changed her RX.     She also stated that the diazepam is als

## 2019-01-22 ENCOUNTER — TELEPHONE (OUTPATIENT)
Dept: FAMILY MEDICINE CLINIC | Facility: CLINIC | Age: 49
End: 2019-01-22

## 2019-01-22 ENCOUNTER — OFFICE VISIT (OUTPATIENT)
Dept: FAMILY MEDICINE CLINIC | Facility: CLINIC | Age: 49
End: 2019-01-22
Payer: COMMERCIAL

## 2019-01-22 VITALS
DIASTOLIC BLOOD PRESSURE: 80 MMHG | WEIGHT: 123 LBS | HEART RATE: 130 BPM | HEIGHT: 64.5 IN | SYSTOLIC BLOOD PRESSURE: 130 MMHG | BODY MASS INDEX: 20.74 KG/M2 | RESPIRATION RATE: 18 BRPM

## 2019-01-22 DIAGNOSIS — E83.19 IRON EXCESS: ICD-10-CM

## 2019-01-22 DIAGNOSIS — E03.9 HYPOTHYROIDISM, UNSPECIFIED TYPE: Primary | ICD-10-CM

## 2019-01-22 DIAGNOSIS — E83.00 LOW CERULOPLASMIN LEVEL: ICD-10-CM

## 2019-01-22 DIAGNOSIS — R25.1 TREMOR: ICD-10-CM

## 2019-01-22 PROBLEM — E86.0 DEHYDRATION: Status: RESOLVED | Noted: 2018-12-24 | Resolved: 2019-01-22

## 2019-01-22 PROBLEM — R11.11 VOMITING WITHOUT NAUSEA, INTRACTABILITY OF VOMITING NOT SPECIFIED, UNSPECIFIED VOMITING TYPE: Status: RESOLVED | Noted: 2018-12-24 | Resolved: 2019-01-22

## 2019-01-22 PROBLEM — F10.230 ALCOHOL WITHDRAWAL SYNDROME WITHOUT COMPLICATION (HCC): Status: RESOLVED | Noted: 2018-12-24 | Resolved: 2019-01-22

## 2019-01-22 PROBLEM — F10.939 ALCOHOL WITHDRAWAL (HCC): Status: RESOLVED | Noted: 2018-12-25 | Resolved: 2019-01-22

## 2019-01-22 PROBLEM — F10.239 ALCOHOL WITHDRAWAL (HCC): Status: RESOLVED | Noted: 2018-12-25 | Resolved: 2019-01-22

## 2019-01-22 PROBLEM — E87.20 METABOLIC ACIDOSIS: Status: RESOLVED | Noted: 2018-12-24 | Resolved: 2019-01-22

## 2019-01-22 PROBLEM — E87.2 METABOLIC ACIDOSIS: Status: RESOLVED | Noted: 2018-12-24 | Resolved: 2019-01-22

## 2019-01-22 PROBLEM — F10.930 ALCOHOL WITHDRAWAL SYNDROME WITHOUT COMPLICATION (HCC): Status: RESOLVED | Noted: 2018-12-24 | Resolved: 2019-01-22

## 2019-01-22 PROCEDURE — 99214 OFFICE O/P EST MOD 30 MIN: CPT | Performed by: FAMILY MEDICINE

## 2019-01-22 RX ORDER — LEVOTHYROXINE SODIUM 0.07 MG/1
75 TABLET ORAL
Qty: 90 TABLET | Refills: 1 | Status: SHIPPED | OUTPATIENT
Start: 2019-01-22 | End: 2019-01-22

## 2019-01-22 RX ORDER — LEVOTHYROXINE SODIUM 0.05 MG/1
50 TABLET ORAL
Qty: 90 TABLET | Refills: 0 | Status: SHIPPED | OUTPATIENT
Start: 2019-01-22 | End: 2019-02-22

## 2019-01-22 NOTE — TELEPHONE ENCOUNTER
Pt was here today to see Dr. Derrick Amin pt was instructed to call back and provide her zinc intake on her multivitamins, pt stated it is 75%. Pt got another multivitamins with 100% zinc and no Iron.  Pt also is requesting to refill her diazepam medication a li

## 2019-01-22 NOTE — PATIENT INSTRUCTIONS
Multivitamin without iron    Labs in 2 months thyroid, iron, copper    Stay on 50 mcg thyroid medication.

## 2019-01-22 NOTE — PROGRESS NOTES
Has been in and out of the ER several times in the last 2 months with alcohol intoxication and withdrawal.  I reviewed several phone calls to the gastroenterology office including one that ended with a threat of drinking herself to death and the ambulance node    • Esophageal reflux    • Fatigue    • Feeling lonely    • Frequent urination    • H/O abdominal hysterectomy     had her uterus removed in stages, over the course of 3 surgeries. (2001, 2008, 2009).    • HEADACHES     migraine   • Heart palpitations Medications:  Levothyroxine Sodium 50 MCG Oral Tab Take 1 tablet (50 mcg total) by mouth before breakfast. Disp: 90 tablet Rfl: 0   diazepam 5 MG Oral Tab Take 1 tablet (5 mg total) by mouth every 12 (twelve) hours as needed.  Disp: 60 tablet Rfl: 0   Multi reviewed her laboratory and scan.     Assessment tremor, possibly essential tremor as it runs in the family although this should not involve her abdomen #2 low ceruloplasmin #3 elevated iron #4 hypothyroidism    Plans her Synthroid dose was increased about

## 2019-01-29 DIAGNOSIS — E03.9 HYPOTHYROIDISM, UNSPECIFIED TYPE: ICD-10-CM

## 2019-01-29 RX ORDER — LEVOTHYROXINE SODIUM 0.03 MG/1
25 TABLET ORAL
Qty: 30 TABLET | Refills: 1 | OUTPATIENT
Start: 2019-01-29

## 2019-02-18 DIAGNOSIS — F41.9 ANXIETY: ICD-10-CM

## 2019-02-18 RX ORDER — DIAZEPAM 5 MG/1
TABLET ORAL
Qty: 60 TABLET | Refills: 0 | Status: SHIPPED | OUTPATIENT
Start: 2019-02-18 | End: 2019-04-03

## 2019-02-20 DIAGNOSIS — K58.9 IRRITABLE BOWEL SYNDROME WITHOUT DIARRHEA: ICD-10-CM

## 2019-02-20 RX ORDER — SUCRALFATE 1 G/1
TABLET ORAL
Qty: 90 TABLET | Refills: 2 | Status: SHIPPED | OUTPATIENT
Start: 2019-02-20 | End: 2019-07-03

## 2019-02-21 ENCOUNTER — LAB ENCOUNTER (OUTPATIENT)
Dept: LAB | Age: 49
End: 2019-02-21
Attending: FAMILY MEDICINE
Payer: COMMERCIAL

## 2019-02-21 DIAGNOSIS — E83.00 LOW CERULOPLASMIN LEVEL: ICD-10-CM

## 2019-02-21 DIAGNOSIS — E83.19 IRON EXCESS: ICD-10-CM

## 2019-02-21 DIAGNOSIS — R79.89 ELEVATED LFTS: ICD-10-CM

## 2019-02-21 DIAGNOSIS — E03.9 HYPOTHYROIDISM, UNSPECIFIED TYPE: ICD-10-CM

## 2019-02-21 LAB
CERULOPLASMIN SERPL-MCNC: 22 MG/DL (ref 20–60)
IGA SERPL-MCNC: 121 MG/DL (ref 70–312)
IGM SERPL-MCNC: 146 MG/DL (ref 43–279)
IMMUNOGLOBULIN PNL SER-MCNC: 732 MG/DL (ref 791–1643)
IRON SATURATION: 16 % (ref 15–50)
IRON SERPL-MCNC: 67 UG/DL (ref 50–170)
T4 FREE SERPL-MCNC: 1.1 NG/DL (ref 0.8–1.7)
TOTAL IRON BINDING CAPACITY: 420 UG/DL (ref 240–450)
TRANSFERRIN SERPL-MCNC: 282 MG/DL (ref 200–360)
TSI SER-ACNC: 7.78 MIU/ML (ref 0.36–3.74)

## 2019-02-21 PROCEDURE — 36415 COLL VENOUS BLD VENIPUNCTURE: CPT | Performed by: INTERNAL MEDICINE

## 2019-02-21 PROCEDURE — 83540 ASSAY OF IRON: CPT

## 2019-02-21 PROCEDURE — 82784 ASSAY IGA/IGD/IGG/IGM EACH: CPT

## 2019-02-21 PROCEDURE — 83516 IMMUNOASSAY NONANTIBODY: CPT

## 2019-02-21 PROCEDURE — 84443 ASSAY THYROID STIM HORMONE: CPT

## 2019-02-21 PROCEDURE — 81256 HFE GENE: CPT | Performed by: INTERNAL MEDICINE

## 2019-02-21 PROCEDURE — 82390 ASSAY OF CERULOPLASMIN: CPT

## 2019-02-21 PROCEDURE — 83550 IRON BINDING TEST: CPT

## 2019-02-21 PROCEDURE — 80076 HEPATIC FUNCTION PANEL: CPT | Performed by: INTERNAL MEDICINE

## 2019-02-21 PROCEDURE — 86256 FLUORESCENT ANTIBODY TITER: CPT

## 2019-02-21 PROCEDURE — 84439 ASSAY OF FREE THYROXINE: CPT

## 2019-02-22 ENCOUNTER — TELEPHONE (OUTPATIENT)
Dept: FAMILY MEDICINE CLINIC | Facility: CLINIC | Age: 49
End: 2019-02-22

## 2019-02-22 DIAGNOSIS — E03.9 HYPOTHYROIDISM, UNSPECIFIED TYPE: Primary | ICD-10-CM

## 2019-02-22 RX ORDER — LEVOTHYROXINE SODIUM 0.07 MG/1
75 TABLET ORAL
Qty: 90 TABLET | Refills: 0 | Status: SHIPPED | OUTPATIENT
Start: 2019-02-22 | End: 2019-08-14

## 2019-02-22 NOTE — TELEPHONE ENCOUNTER
----- Message from Heriberto Mccollum MD sent at 2/22/2019  7:46 AM CST -----  Patient has been on 50 mcg of Synthroid for 6 weeks. TSH better but not normal.  Increase her dose to 75 mcg please recheck TSH in 6-8 weeks.

## 2019-02-22 NOTE — TELEPHONE ENCOUNTER
Called patient - verified patient's name and  - informed pt of doctor's note - patient states the levothyroxine made her feel \"horrible\" and \"not right\". Pt states her head didn't feel right and like \"it was being pinched\". Pt also states she feels her heart racing, she can't sleep, and she has gained weight. Pt states she has not taken the med for 2 days and already feels better. Pt states she does not want to take the med anymore.  Pt also states she feels that the levothyroxine reduces the effectiveness of her diazepam.    Please advise

## 2019-02-23 LAB — F-ACTIN (SMOOTH MUSCLE) AB: 46 UNITS

## 2019-02-24 DIAGNOSIS — E03.9 HYPOTHYROIDISM, UNSPECIFIED TYPE: ICD-10-CM

## 2019-02-25 RX ORDER — LEVOTHYROXINE SODIUM 0.03 MG/1
25 TABLET ORAL
Qty: 30 TABLET | Refills: 1 | OUTPATIENT
Start: 2019-02-25

## 2019-03-14 ENCOUNTER — TELEPHONE (OUTPATIENT)
Dept: INTERNAL MEDICINE CLINIC | Facility: CLINIC | Age: 49
End: 2019-03-14

## 2019-03-23 ENCOUNTER — TELEPHONE (OUTPATIENT)
Dept: FAMILY MEDICINE CLINIC | Facility: CLINIC | Age: 49
End: 2019-03-23

## 2019-04-01 NOTE — PROGRESS NOTES
[facility]  [address], [address]  [address]  [phone] ([phone]) urination    • H/O abdominal hysterectomy     had her uterus removed in stages, over the course of 3 surgeries. (2001, 2008, 2009).    • HEADACHES     migraine   • Heart palpitations    • Hemorrhoids    • High cholesterol    • History of cholecystectomy Sister    • Other (bacterial pneumonia) Paternal Grandmother    • Other (cirrhosis) Paternal Grandfather    • Other (hypothyroidism) Maternal Grandmother    • Other (pancreatic neoplasm) Maternal Grandmother    • Other (stroke syndrome) Maternal Grandfathe purpose she was more receptive)   - HAV/HBV susceptible; should vaccinate   - Encouraged ongoing efforts at sobriety  - Serologies generally unremarkable. SMA mildly positive with negative TERESA. Ferritin elevated but HFE testing negative.  Ceruloplasmin low

## 2019-04-03 DIAGNOSIS — F41.9 ANXIETY: ICD-10-CM

## 2019-04-03 RX ORDER — DIAZEPAM 5 MG/1
TABLET ORAL
Qty: 60 TABLET | Refills: 0 | Status: SHIPPED | OUTPATIENT
Start: 2019-04-03 | End: 2019-06-06

## 2019-04-09 DIAGNOSIS — R11.0 NAUSEA: ICD-10-CM

## 2019-04-09 RX ORDER — ONDANSETRON 4 MG/1
4 TABLET, FILM COATED ORAL EVERY 8 HOURS PRN
Qty: 40 TABLET | Refills: 0 | Status: SHIPPED | OUTPATIENT
Start: 2019-04-09 | End: 2019-09-03

## 2019-05-25 ENCOUNTER — TELEPHONE (OUTPATIENT)
Dept: FAMILY MEDICINE CLINIC | Facility: CLINIC | Age: 49
End: 2019-05-25

## 2019-06-06 DIAGNOSIS — F41.9 ANXIETY: ICD-10-CM

## 2019-06-06 RX ORDER — DIAZEPAM 5 MG/1
TABLET ORAL
Qty: 60 TABLET | Refills: 0 | Status: SHIPPED | OUTPATIENT
Start: 2019-06-06 | End: 2019-07-02

## 2019-06-10 ENCOUNTER — TELEPHONE (OUTPATIENT)
Dept: FAMILY MEDICINE CLINIC | Facility: CLINIC | Age: 49
End: 2019-06-10

## 2019-06-18 ENCOUNTER — OFFICE VISIT (OUTPATIENT)
Dept: FAMILY MEDICINE CLINIC | Facility: CLINIC | Age: 49
End: 2019-06-18
Payer: MEDICARE

## 2019-06-18 VITALS
OXYGEN SATURATION: 98 % | HEIGHT: 64.5 IN | TEMPERATURE: 98 F | BODY MASS INDEX: 21.42 KG/M2 | WEIGHT: 127 LBS | RESPIRATION RATE: 18 BRPM | DIASTOLIC BLOOD PRESSURE: 60 MMHG | SYSTOLIC BLOOD PRESSURE: 98 MMHG | HEART RATE: 129 BPM

## 2019-06-18 DIAGNOSIS — M54.50 LUMBAR BACK PAIN: ICD-10-CM

## 2019-06-18 DIAGNOSIS — M54.12 CERVICAL RADICULAR PAIN: Primary | ICD-10-CM

## 2019-06-18 PROCEDURE — 99213 OFFICE O/P EST LOW 20 MIN: CPT | Performed by: FAMILY MEDICINE

## 2019-06-18 PROCEDURE — 96372 THER/PROPH/DIAG INJ SC/IM: CPT | Performed by: FAMILY MEDICINE

## 2019-06-18 RX ORDER — KETOROLAC TROMETHAMINE 10 MG/1
10 TABLET, FILM COATED ORAL EVERY 6 HOURS
Qty: 20 TABLET | Refills: 0 | Status: SHIPPED | OUTPATIENT
Start: 2019-06-18 | End: 2019-10-22

## 2019-06-18 RX ORDER — KETOROLAC TROMETHAMINE 30 MG/ML
60 INJECTION, SOLUTION INTRAMUSCULAR; INTRAVENOUS ONCE
Status: DISCONTINUED | OUTPATIENT
Start: 2019-06-18 | End: 2019-06-18

## 2019-06-18 RX ORDER — CYCLOBENZAPRINE HCL 5 MG
5 TABLET ORAL 3 TIMES DAILY
Qty: 21 TABLET | Refills: 0 | Status: SHIPPED | OUTPATIENT
Start: 2019-06-18 | End: 2019-06-25

## 2019-06-18 RX ORDER — KETOROLAC TROMETHAMINE 30 MG/ML
30 INJECTION, SOLUTION INTRAMUSCULAR; INTRAVENOUS ONCE
Status: COMPLETED | OUTPATIENT
Start: 2019-06-18 | End: 2019-06-18

## 2019-06-18 RX ADMIN — KETOROLAC TROMETHAMINE 60 MG: 30 INJECTION, SOLUTION INTRAMUSCULAR; INTRAVENOUS at 11:39:00

## 2019-06-18 NOTE — PROGRESS NOTES
Injured her neck and back to doing some yard work for her mother about 8 days ago. She was seen in the urgent care where she was given a dose of ketorolac which helps tremendously. She was prescribed Flexeril and Naprosyn.   She still having significant p Osteoarthritis    • Other conditions of KDGAT(303.77)    • OTHER DISEASES     endometriosis   • OTHER DISEASES     colon polyp-will get path for me to determine f/up   • OTHER DISEASES age 23     pyelonephritis   • Pain in joints    • Personal history of a mouth daily. Disp:  Rfl:    SUCRALFATE 1 g Oral Tab TAKE 1 TABLET (1 GRAM TOTAL) BY MOUTH 3 (THREE) TIMES DAILY BEFORE MEALS. Disp: 90 tablet Rfl: 2     ALLERGIES:   Bupropion Hcl; Neurontin [Gabapentin]; Pcn [Penicillins];  Tramadol; Zoloft; Ciprocin-Flu work-up to follow.

## 2019-06-27 ENCOUNTER — TELEPHONE (OUTPATIENT)
Dept: FAMILY MEDICINE CLINIC | Facility: CLINIC | Age: 49
End: 2019-06-27

## 2019-06-27 NOTE — TELEPHONE ENCOUNTER
Pt went to ask for an x-ray the day she saw Dr Clements. They were completely booked. She wanted to let him know that she wants to let him know that she is getting the x-ray done in [place]. And that it is getting worse. Her back is really bad. But you think i

## 2019-07-02 DIAGNOSIS — F41.9 ANXIETY: ICD-10-CM

## 2019-07-02 RX ORDER — DIAZEPAM 5 MG/1
TABLET ORAL
Qty: 60 TABLET | Refills: 0 | Status: SHIPPED | OUTPATIENT
Start: 2019-07-02 | End: 2019-09-15

## 2019-07-02 NOTE — TELEPHONE ENCOUNTER
Pt is requesting to please ask her pcp to please approve the refill for the diazepam no later then tomorrow, pt stated [facility] and by Friday pt will be out of med. Pt concerned. Please call and advise.

## 2019-07-03 ENCOUNTER — TELEPHONE (OUTPATIENT)
Dept: FAMILY MEDICINE CLINIC | Facility: CLINIC | Age: 49
End: 2019-07-03

## 2019-07-03 DIAGNOSIS — K58.9 IRRITABLE BOWEL SYNDROME WITHOUT DIARRHEA: ICD-10-CM

## 2019-07-03 RX ORDER — SUCRALFATE 1 G/1
TABLET ORAL
Qty: 90 TABLET | Refills: 2 | Status: SHIPPED | OUTPATIENT
Start: 2019-07-03 | End: 2019-08-13

## 2019-08-05 ENCOUNTER — TELEPHONE (OUTPATIENT)
Dept: FAMILY MEDICINE CLINIC | Facility: CLINIC | Age: 49
End: 2019-08-05
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
## 2019-08-12 ENCOUNTER — HOSPITAL ENCOUNTER (OUTPATIENT)
Dept: GENERAL RADIOLOGY | Age: 49
Discharge: HOME | End: 2019-08-12
Attending: FAMILY MEDICINE
Payer: COMMERCIAL

## 2019-08-12 ENCOUNTER — APPOINTMENT (OUTPATIENT)
Dept: LAB | Age: 49
End: 2019-08-12
Attending: FAMILY MEDICINE
Payer: COMMERCIAL

## 2019-08-12 DIAGNOSIS — M54.50 LUMBAR BACK PAIN: ICD-10-CM

## 2019-08-12 DIAGNOSIS — M54.12 CERVICAL RADICULAR PAIN: ICD-10-CM

## 2019-08-12 LAB
T4 FREE SERPL-MCNC: 1.2 NG/DL (ref 0.8–1.7)
TSI SER-ACNC: 4.35 MIU/ML (ref 0.36–3.74)

## 2019-08-12 PROCEDURE — 82550 ASSAY OF CK (CPK): CPT | Performed by: INTERNAL MEDICINE

## 2019-08-12 PROCEDURE — 36415 COLL VENOUS BLD VENIPUNCTURE: CPT | Performed by: INTERNAL MEDICINE

## 2019-08-12 PROCEDURE — 84439 ASSAY OF FREE THYROXINE: CPT

## 2019-08-12 PROCEDURE — 80053 COMPREHEN METABOLIC PANEL: CPT | Performed by: INTERNAL MEDICINE

## 2019-08-12 PROCEDURE — 72110 X-RAY EXAM L-2 SPINE 4/>VWS: CPT | Performed by: FAMILY MEDICINE

## 2019-08-12 PROCEDURE — 84443 ASSAY THYROID STIM HORMONE: CPT

## 2019-08-12 PROCEDURE — 85610 PROTHROMBIN TIME: CPT | Performed by: INTERNAL MEDICINE

## 2019-08-12 PROCEDURE — 72050 X-RAY EXAM NECK SPINE 4/5VWS: CPT | Performed by: FAMILY MEDICINE

## 2019-08-13 DIAGNOSIS — K58.9 IRRITABLE BOWEL SYNDROME WITHOUT DIARRHEA: ICD-10-CM

## 2019-08-13 RX ORDER — SUCRALFATE 1 G/1
1 TABLET ORAL 4 TIMES DAILY
Qty: 360 TABLET | Refills: 2 | Status: SHIPPED | OUTPATIENT
Start: 2019-08-13 | End: 2020-02-12

## 2019-08-14 ENCOUNTER — TELEPHONE (OUTPATIENT)
Dept: FAMILY MEDICINE CLINIC | Facility: CLINIC | Age: 49
End: 2019-08-14

## 2019-08-14 RX ORDER — LEVOTHYROXINE SODIUM 0.05 MG/1
50 TABLET ORAL
Qty: 90 TABLET | Refills: 1 | Status: SHIPPED | OUTPATIENT
Start: 2019-08-14 | End: 2019-11-06

## 2019-08-14 NOTE — TELEPHONE ENCOUNTER
Pt states she has been taking 50mcg of levothyroxine and does not want to increase the dosage. Pt states she has been taking tumeric 500mg twice daily.     Pt has appt with you next week - please advise if you want to refill levothyroxine now or wait until

## 2019-08-14 NOTE — TELEPHONE ENCOUNTER
Pt is requesting to talk to a nurse to discuss her levothyroxine dosage, pt thinks she should be on 50mcg and not 75mcg, pt mentioned her labs were ok. pt also is requesting a 90 day supply to the [facility].

## 2019-08-15 ENCOUNTER — TELEPHONE (OUTPATIENT)
Dept: SURGERY | Facility: CLINIC | Age: 49
End: 2019-08-15

## 2019-08-15 NOTE — TELEPHONE ENCOUNTER
Returned patient call regarding labs results. Liver enzymes improving. Encouraged to remain abstinent of ETOH.     ESTEFANIA Hugo  Nurse Practitioner, Hepatology  [phone] (office)

## 2019-08-21 ENCOUNTER — TELEPHONE (OUTPATIENT)
Dept: FAMILY MEDICINE CLINIC | Facility: CLINIC | Age: 49
End: 2019-08-21

## 2019-08-21 NOTE — TELEPHONE ENCOUNTER
She has mild facet arthritis in both the cervical and lumbar regions. This should not cause 9 out of 10 pain. On the x-ray they show evidence of atherosclerosis.   She is having 9 out of 10 pain I would recommend an ER visit for evaluation as this may be

## 2019-08-21 NOTE — TELEPHONE ENCOUNTER
Please advise     LOV 6/18/19    Upcoming appt 9/9    Xray results from 8/12  CONCLUSION:  Mild facet arthrosis at C4-C5 and C7-T1. CONCLUSION:  Bilateral facet arthritis L4-L5 and L5-S1.

## 2019-08-21 NOTE — TELEPHONE ENCOUNTER
Patient is calling about being in immense pain (9 out of 10). Back hurts middle part, lower back pain that is running thru leg, rt side of neck back of head. She has had xrays completed recently and does not have appt w/ [facility] till September 9.     She is ne

## 2019-08-22 NOTE — TELEPHONE ENCOUNTER
Spoke to patient -using Turmeric - has some Tramadol left. May go to Urgent Care. Sounds good on the phone.

## 2019-09-01 DIAGNOSIS — R11.0 NAUSEA: ICD-10-CM

## 2019-09-02 ENCOUNTER — TELEPHONE (OUTPATIENT)
Dept: FAMILY MEDICINE CLINIC | Facility: CLINIC | Age: 49
End: 2019-09-02

## 2019-09-02 DIAGNOSIS — R11.0 NAUSEA: ICD-10-CM

## 2019-09-02 NOTE — TELEPHONE ENCOUNTER
Pt paged over holiday for zofran refill  Please see if dr. Conner Riley will refill  Pt has been in and out of ER for ETOH intoxication   Please notify pt that paging service if for emergencies only and not medication refills

## 2019-09-03 RX ORDER — ONDANSETRON 4 MG/1
4 TABLET, FILM COATED ORAL EVERY 8 HOURS PRN
Qty: 40 TABLET | Refills: 0 | Status: SHIPPED | OUTPATIENT
Start: 2019-09-03 | End: 2020-01-28

## 2019-09-03 RX ORDER — ONDANSETRON 4 MG/1
TABLET, FILM COATED ORAL
Qty: 40 TABLET | Refills: 0 | OUTPATIENT
Start: 2019-09-03

## 2019-09-03 NOTE — TELEPHONE ENCOUNTER
Please review previous message. Spoke to pt she states requesting Zofran due to nausea and vomitting due to fatty liver. Pt states she had appt today but had to cancel. Please advise

## 2019-09-13 DIAGNOSIS — K58.9 IRRITABLE BOWEL SYNDROME WITHOUT DIARRHEA: ICD-10-CM

## 2019-09-13 RX ORDER — SUCRALFATE 1 G/1
TABLET ORAL
Qty: 90 TABLET | Refills: 2 | OUTPATIENT
Start: 2019-09-13

## 2019-09-15 DIAGNOSIS — F41.9 ANXIETY: ICD-10-CM

## 2019-09-16 RX ORDER — DIAZEPAM 5 MG/1
TABLET ORAL
Qty: 60 TABLET | Refills: 0 | Status: SHIPPED | OUTPATIENT
Start: 2019-09-16 | End: 2019-10-24

## 2019-10-12 DIAGNOSIS — F41.9 ANXIETY: ICD-10-CM

## 2019-10-14 RX ORDER — DIAZEPAM 5 MG/1
TABLET ORAL
Qty: 60 TABLET | Refills: 0 | OUTPATIENT
Start: 2019-10-14

## 2019-10-21 ENCOUNTER — TELEPHONE (OUTPATIENT)
Dept: FAMILY MEDICINE CLINIC | Facility: CLINIC | Age: 49
End: 2019-10-21

## 2019-10-22 NOTE — PROGRESS NOTES
This patient is here for follow-up of anxiety and depression. She admits to having been back on the alcohol and was admitted for several days rehab at [facility]. She does not need diazepam refilled now but will needed in the next few weeks.   She was g stomach ulcers    • Hoarseness, chronic    • IBS    • IBS (irritable bowel syndrome)    • Indigestion    • Leaking of urine    • Loss of appetite    • Migraines    • Mouth sores    • Nausea    • Night sweats    • OSTEOARTHRITIS     back, knees and hip   • breakfast., Disp: 90 tablet, Rfl: 1  sucralfate 1 g Oral Tab, Take 1 tablet (1 g total) by mouth 4 (four) times daily. , Disp: 360 tablet, Rfl: 2  Multiple Vitamin (MULTI-VITAMIN DAILY) Oral Tab, Take 1 tablet by mouth daily. , Disp: , Rfl:   Cholecalciferol

## 2019-10-22 NOTE — PATIENT INSTRUCTIONS
Do not take ketorolac or diclofenac regularly while on Trintellix. The conbination will increase the risk of ulcer and GI bleeding. Use 1 inhaler for every 2 cigs you were smoking. Every few weeks, cut down by one inhaler a day.

## 2019-10-23 ENCOUNTER — TELEPHONE (OUTPATIENT)
Dept: FAMILY MEDICINE CLINIC | Facility: CLINIC | Age: 49
End: 2019-10-23

## 2019-10-24 DIAGNOSIS — F41.9 ANXIETY: ICD-10-CM

## 2019-10-24 RX ORDER — DIAZEPAM 5 MG/1
TABLET ORAL
Qty: 60 TABLET | Refills: 0 | Status: SHIPPED | OUTPATIENT
Start: 2019-10-24 | End: 2019-11-21

## 2019-10-24 NOTE — TELEPHONE ENCOUNTER
Dr. Kimble January,  See refill request below. Last refill 9/16/19    Last office visit 10/22/19.     Medication pended

## 2019-11-05 RX ORDER — CITALOPRAM 10 MG/1
10 TABLET ORAL DAILY
Qty: 30 TABLET | Refills: 2 | Status: SHIPPED | OUTPATIENT
Start: 2019-11-05 | End: 2019-11-06

## 2019-11-06 ENCOUNTER — TELEPHONE (OUTPATIENT)
Dept: FAMILY MEDICINE CLINIC | Facility: CLINIC | Age: 49
End: 2019-11-06

## 2019-11-06 NOTE — TELEPHONE ENCOUNTER
I agree with the nurse that stopping the levothyroxine and citalopram are not a good idea.   We will go ahead and take these 2 medications off of her list.

## 2019-11-06 NOTE — TELEPHONE ENCOUNTER
Pt stated is having severe side effects from the levothyroxine pt stop taking it since about the 20th of October, pt can not take lexapro either, pt is requesting to discuss with the nurse. Please call pt and advise.

## 2019-11-06 NOTE — TELEPHONE ENCOUNTER
Spoke to pt she states she stopped the Citalopram due to side effects but pt states she is still on Levothyroxine but does not want to take  Levothyroxine she states she does not feel right on medication experiencing more anxiety. .Pt states she is going of

## 2019-11-19 ENCOUNTER — TELEPHONE (OUTPATIENT)
Dept: FAMILY MEDICINE CLINIC | Facility: CLINIC | Age: 49
End: 2019-11-19

## 2019-11-19 NOTE — TELEPHONE ENCOUNTER
See 10/23/19 telephone note. Trintellix not covered by insurance. rx was changed to Citalopram 10mg, however last note states pt stopped this medication.     LMTCB

## 2019-11-19 NOTE — TELEPHONE ENCOUNTER
Pt is requesting to talk to the nurse regarding her trantalix medication. Please call pt and advise.

## 2019-11-21 DIAGNOSIS — F41.9 ANXIETY: ICD-10-CM

## 2019-11-21 RX ORDER — DIAZEPAM 5 MG/1
TABLET ORAL
Qty: 60 TABLET | Refills: 0 | OUTPATIENT
Start: 2019-11-21

## 2019-11-21 RX ORDER — DIAZEPAM 5 MG/1
TABLET ORAL
Qty: 60 TABLET | Refills: 0 | Status: SHIPPED | OUTPATIENT
Start: 2019-11-21 | End: 2019-12-13

## 2019-11-21 NOTE — TELEPHONE ENCOUNTER
Dr. Ching Apo,  See refill request  Last refill  10/24/19 #60  Last office visit 10/22/19    Medication pended

## 2019-12-13 ENCOUNTER — APPOINTMENT (OUTPATIENT)
Dept: LAB | Age: 49
End: 2019-12-13
Attending: FAMILY MEDICINE
Payer: COMMERCIAL

## 2019-12-13 DIAGNOSIS — E03.8 OTHER SPECIFIED HYPOTHYROIDISM: ICD-10-CM

## 2019-12-13 PROBLEM — G31.9 BRAIN ATROPHY (HCC): Status: ACTIVE | Noted: 2018-12-24

## 2019-12-13 PROCEDURE — 84443 ASSAY THYROID STIM HORMONE: CPT

## 2019-12-13 PROCEDURE — 84439 ASSAY OF FREE THYROXINE: CPT

## 2019-12-13 PROCEDURE — 36415 COLL VENOUS BLD VENIPUNCTURE: CPT

## 2019-12-20 ENCOUNTER — TELEPHONE (OUTPATIENT)
Dept: FAMILY MEDICINE CLINIC | Facility: CLINIC | Age: 49
End: 2019-12-20
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
## 2020-01-15 RX ORDER — LEVOTHYROXINE SODIUM 0.05 MG/1
50 TABLET ORAL
Qty: 30 TABLET | Refills: 0 | Status: SHIPPED | OUTPATIENT
Start: 2020-01-15 | End: 2020-03-27

## 2020-01-15 NOTE — TELEPHONE ENCOUNTER
Patient is needing a short supply of Levothyroxine Sodium 50 MCG Oral Tab -  She has 8 pills left and has a follow up appt scheduled with  for Feb 3, 2020. She currently does not have insurance till February 1.     She would like script to go to [facility]

## 2020-01-28 DIAGNOSIS — R11.0 NAUSEA: ICD-10-CM

## 2020-01-28 RX ORDER — ONDANSETRON 4 MG/1
TABLET, FILM COATED ORAL
Qty: 40 TABLET | Refills: 0 | OUTPATIENT
Start: 2020-01-28

## 2020-01-28 RX ORDER — ONDANSETRON 4 MG/1
4 TABLET, FILM COATED ORAL EVERY 8 HOURS PRN
Qty: 12 TABLET | Refills: 0 | Status: SHIPPED | OUTPATIENT
Start: 2020-01-28 | End: 2020-02-12

## 2020-01-28 NOTE — TELEPHONE ENCOUNTER
Pt request #12 of the ondansetron as her insurance will \"kick in\" soon and then she can get a full script. Pt states she is out and needs this \"really bad\".   Pls send #12 to the [facility] in [facility]

## 2020-02-12 ENCOUNTER — TELEPHONE (OUTPATIENT)
Dept: FAMILY MEDICINE CLINIC | Facility: CLINIC | Age: 50
End: 2020-02-12

## 2020-02-12 DIAGNOSIS — K58.9 IRRITABLE BOWEL SYNDROME WITHOUT DIARRHEA: ICD-10-CM

## 2020-02-12 DIAGNOSIS — R11.0 NAUSEA: ICD-10-CM

## 2020-02-12 RX ORDER — SUCRALFATE 1 G/1
1 TABLET ORAL 4 TIMES DAILY
Qty: 360 TABLET | Refills: 0 | Status: SHIPPED | OUTPATIENT
Start: 2020-02-12 | End: 2020-02-17

## 2020-02-12 RX ORDER — ONDANSETRON 4 MG/1
4 TABLET, FILM COATED ORAL EVERY 8 HOURS PRN
Qty: 36 TABLET | Refills: 0 | Status: SHIPPED | OUTPATIENT
Start: 2020-02-12 | End: 2020-02-17

## 2020-02-12 NOTE — TELEPHONE ENCOUNTER
Refill Zofran  LOV 12/13/2019  Last refill 1/28/2020  [id number]   Refills 0      Refill Sucralfate  Last refill 8/13/19    Refills 2    [facility] - now has [employer].

## 2020-02-12 NOTE — TELEPHONE ENCOUNTER
Pt needs refills of  zofran and sucralfate  Pt now has [address] to [facility]

## 2020-02-13 ENCOUNTER — TELEPHONE (OUTPATIENT)
Dept: FAMILY MEDICINE CLINIC | Facility: CLINIC | Age: 50
End: 2020-02-13

## 2020-02-13 NOTE — TELEPHONE ENCOUNTER
Pt checking on the status of the new prescriptions/refills for the zofran and sucralfate. Please call and advise.

## 2020-02-17 ENCOUNTER — TELEPHONE (OUTPATIENT)
Dept: FAMILY MEDICINE CLINIC | Facility: CLINIC | Age: 50
End: 2020-02-17

## 2020-02-17 DIAGNOSIS — K58.9 IRRITABLE BOWEL SYNDROME WITHOUT DIARRHEA: ICD-10-CM

## 2020-02-17 DIAGNOSIS — R11.0 NAUSEA: ICD-10-CM

## 2020-02-17 RX ORDER — SUCRALFATE 1 G/1
1 TABLET ORAL 4 TIMES DAILY
Qty: 120 TABLET | Refills: 0 | Status: SHIPPED | OUTPATIENT
Start: 2020-02-17 | End: 2020-02-24

## 2020-02-17 RX ORDER — ONDANSETRON 4 MG/1
4 TABLET, FILM COATED ORAL EVERY 8 HOURS PRN
Qty: 12 TABLET | Refills: 0 | Status: SHIPPED | OUTPATIENT
Start: 2020-02-17 | End: 2020-02-26

## 2020-02-17 NOTE — TELEPHONE ENCOUNTER
30 day scripts for Zofran and Sulcrufate sent to [facility] told [facility] does not have account with them. Show they did receive our scripts. Left message for pt telling her she will need to [facility] .

## 2020-02-17 NOTE — TELEPHONE ENCOUNTER
Please fax the scripts for zofran and sulculfrate [facility]  They state they did not receive them  Also pt requesting  [facility]

## 2020-02-24 DIAGNOSIS — K58.9 IRRITABLE BOWEL SYNDROME WITHOUT DIARRHEA: ICD-10-CM

## 2020-02-24 RX ORDER — SUCRALFATE 1 G/1
1 TABLET ORAL 4 TIMES DAILY
Qty: 120 TABLET | Refills: 0 | Status: SHIPPED | OUTPATIENT
Start: 2020-02-24 | End: 2020-03-12

## 2020-02-26 DIAGNOSIS — R11.0 NAUSEA: ICD-10-CM

## 2020-02-26 RX ORDER — ONDANSETRON 4 MG/1
4 TABLET, FILM COATED ORAL EVERY 8 HOURS PRN
Qty: 12 TABLET | Refills: 0 | Status: SHIPPED | OUTPATIENT
Start: 2020-02-26 | End: 2020-03-27 | Stop reason: ALTCHOICE

## 2020-03-06 RX ORDER — ONDANSETRON 4 MG/1
4 TABLET, ORALLY DISINTEGRATING ORAL EVERY 8 HOURS PRN
Qty: 12 TABLET | Refills: 1 | Status: SHIPPED | OUTPATIENT
Start: 2020-03-06 | End: 2020-03-27 | Stop reason: ALTCHOICE

## 2020-03-06 NOTE — TELEPHONE ENCOUNTER
Refill Ondansetron. Wants the one that goes under her tongue. W[facility] month supply[facility] [phone]

## 2020-03-12 DIAGNOSIS — K58.9 IRRITABLE BOWEL SYNDROME WITHOUT DIARRHEA: ICD-10-CM

## 2020-03-12 RX ORDER — SUCRALFATE 1 G/1
1 TABLET ORAL 4 TIMES DAILY
Qty: 120 TABLET | Refills: 0 | Status: SHIPPED | OUTPATIENT
Start: 2020-03-12 | End: 2020-11-28 | Stop reason: ALTCHOICE

## 2020-03-27 ENCOUNTER — TELEPHONE (OUTPATIENT)
Dept: FAMILY MEDICINE CLINIC | Facility: CLINIC | Age: 50
End: 2020-03-27

## 2020-03-27 DIAGNOSIS — F41.9 ANXIETY: ICD-10-CM

## 2020-03-27 DIAGNOSIS — F41.9 ANXIETY DISORDER, UNSPECIFIED TYPE: ICD-10-CM

## 2020-03-27 NOTE — TELEPHONE ENCOUNTER
Pt is requesting a refill for her Diazepam and Ketorolac medications, pt stated she will run out of her meds prior to her appt next week. Please call and advise.

## 2020-03-27 NOTE — TELEPHONE ENCOUNTER
Spoke to patient on the phone. I was willing to refill diazepam.  She should not be taking ketorolac as needed. I discussed that it is meant to be given as an injection in the office and then 3 times a day for 7 days until completed.   She has diclofenac

## 2020-03-29 ENCOUNTER — MOBILE ENCOUNTER (OUTPATIENT)
Dept: FAMILY MEDICINE CLINIC | Facility: CLINIC | Age: 50
End: 2020-03-29

## 2020-03-29 NOTE — PROGRESS NOTES
Spoke to pt. Having some side effects to diazepam: eye twitches, memory issues,fatigue. Going to switch to 7.5 mg twice a day until Isee her Tuesday. Will slowly wean down from there. Try 7 to10 days and then 5 mg bid.

## 2020-03-31 ENCOUNTER — HOSPITAL ENCOUNTER (OUTPATIENT)
Dept: GENERAL RADIOLOGY | Age: 50
Discharge: HOME | End: 2020-03-31
Attending: FAMILY MEDICINE
Payer: MEDICARE

## 2020-03-31 ENCOUNTER — OFFICE VISIT (OUTPATIENT)
Dept: FAMILY MEDICINE CLINIC | Facility: CLINIC | Age: 50
End: 2020-03-31
Payer: MEDICARE

## 2020-03-31 VITALS
HEART RATE: 117 BPM | DIASTOLIC BLOOD PRESSURE: 80 MMHG | WEIGHT: 122 LBS | OXYGEN SATURATION: 98 % | BODY MASS INDEX: 20.58 KG/M2 | RESPIRATION RATE: 18 BRPM | SYSTOLIC BLOOD PRESSURE: 120 MMHG | HEIGHT: 64.5 IN

## 2020-03-31 DIAGNOSIS — F41.9 ANXIETY: ICD-10-CM

## 2020-03-31 DIAGNOSIS — M54.6 CHRONIC MIDLINE THORACIC BACK PAIN: ICD-10-CM

## 2020-03-31 DIAGNOSIS — G89.29 CHRONIC MIDLINE THORACIC BACK PAIN: Primary | ICD-10-CM

## 2020-03-31 DIAGNOSIS — G89.29 CHRONIC MIDLINE THORACIC BACK PAIN: ICD-10-CM

## 2020-03-31 DIAGNOSIS — M54.6 CHRONIC MIDLINE THORACIC BACK PAIN: Primary | ICD-10-CM

## 2020-03-31 PROBLEM — R73.9 HYPERGLYCEMIA: Status: RESOLVED | Noted: 2018-12-24 | Resolved: 2020-03-31

## 2020-03-31 PROBLEM — D69.6 THROMBOCYTOPENIA (HCC): Status: RESOLVED | Noted: 2018-12-24 | Resolved: 2020-03-31

## 2020-03-31 PROBLEM — J41.0 SMOKERS' COUGH (HCC): Chronic | Status: ACTIVE | Noted: 2020-03-31

## 2020-03-31 PROCEDURE — 99213 OFFICE O/P EST LOW 20 MIN: CPT | Performed by: FAMILY MEDICINE

## 2020-03-31 PROCEDURE — 72072 X-RAY EXAM THORAC SPINE 3VWS: CPT | Performed by: FAMILY MEDICINE

## 2020-03-31 RX ORDER — DIAZEPAM 5 MG/1
5 TABLET ORAL EVERY 8 HOURS PRN
Qty: 90 TABLET | Refills: 0 | Status: SHIPPED | OUTPATIENT
Start: 2020-03-31 | End: 2020-05-01
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
## 2020-04-01 ENCOUNTER — TELEPHONE (OUTPATIENT)
Dept: FAMILY MEDICINE CLINIC | Facility: CLINIC | Age: 50
End: 2020-04-01

## 2020-04-01 NOTE — TELEPHONE ENCOUNTER
Dr. Cris Ly,  I spoke to patient regarding xray results. She is asking for refill of Diclofenac 50 that she received from [facility]. He used it tid. She had some and it was helping her back.   Medication pended

## 2020-04-01 NOTE — TELEPHONE ENCOUNTER
PT WANTS DICLOFENAC 50 MG. SHE SAID SHE GOT THIS FROM [facility] AND THEY TALKED ABOUT IT AT THE VISIT YESTERDAY.     WILL [facility] RX FOR HER?      [place] [phone]

## 2020-04-08 DIAGNOSIS — R11.0 NAUSEA: ICD-10-CM

## 2020-04-08 RX ORDER — ONDANSETRON 4 MG/1
TABLET, FILM COATED ORAL
Qty: 12 TABLET | Refills: 0 | Status: SHIPPED | OUTPATIENT
Start: 2020-04-08 | End: 2020-05-01

## 2020-05-01 ENCOUNTER — TELEPHONE (OUTPATIENT)
Dept: FAMILY MEDICINE CLINIC | Facility: CLINIC | Age: 50
End: 2020-05-01

## 2020-05-01 DIAGNOSIS — R11.0 NAUSEA: ICD-10-CM

## 2020-05-01 DIAGNOSIS — F41.9 ANXIETY: ICD-10-CM

## 2020-05-01 RX ORDER — ONDANSETRON 4 MG/1
4 TABLET, FILM COATED ORAL EVERY 8 HOURS PRN
Qty: 60 TABLET | Refills: 1 | Status: SHIPPED | OUTPATIENT
Start: 2020-05-01 | End: 2020-06-25

## 2020-05-01 RX ORDER — DIAZEPAM 5 MG/1
5 TABLET ORAL EVERY 8 HOURS PRN
Qty: 90 TABLET | Refills: 0 | Status: SHIPPED | OUTPATIENT
Start: 2020-05-01 | End: 2020-06-01

## 2020-05-01 NOTE — TELEPHONE ENCOUNTER
Spoke with patient:    Requesting Ondansetron ODT, wants dissolvable. Wants more than #12 at a time. Requesting #60, states GI gave her #60.   Saying she is more nauseous, but doesn't want to follow up at GI, and was told she would be nauseated the rest of

## 2020-05-13 ENCOUNTER — TELEPHONE (OUTPATIENT)
Dept: FAMILY MEDICINE CLINIC | Facility: CLINIC | Age: 50
End: 2020-05-13

## 2020-05-13 NOTE — TELEPHONE ENCOUNTER
PT CALLING AND WANTS  A REFILL OF  ONDASIDERONE.  WE ONLY GAVE HER 12 LAST TIME BECAUSE [facility] WANTED HER TO SEE THE GASTRO AND SHE STATES SHE CANT GO IF SHE IS VOMITING. [facility]      PLS CALL HER BACK TO ADVISE.
Pt informed we filled Ondansetron on 5/1/2020 for #60. Pt states she will call pharmacy.
97.9

## 2020-06-01 DIAGNOSIS — F41.9 ANXIETY: ICD-10-CM

## 2020-06-01 RX ORDER — DIAZEPAM 5 MG/1
5 TABLET ORAL EVERY 8 HOURS PRN
Qty: 90 TABLET | Refills: 0 | Status: SHIPPED | OUTPATIENT
Start: 2020-06-01 | End: 2020-07-10

## 2020-06-02 RX ORDER — ONDANSETRON 4 MG/1
TABLET, ORALLY DISINTEGRATING ORAL
Qty: 12 TABLET | Refills: 1 | Status: SHIPPED | OUTPATIENT
Start: 2020-06-02 | End: 2020-06-25

## 2020-06-25 ENCOUNTER — TELEPHONE (OUTPATIENT)
Dept: FAMILY MEDICINE CLINIC | Facility: CLINIC | Age: 50
End: 2020-06-25

## 2020-06-25 RX ORDER — ONDANSETRON 4 MG/1
4 TABLET, ORALLY DISINTEGRATING ORAL EVERY 8 HOURS PRN
Qty: 60 TABLET | Refills: 0 | Status: SHIPPED | OUTPATIENT
Start: 2020-06-25 | End: 2020-07-11

## 2020-06-25 NOTE — TELEPHONE ENCOUNTER
Pt is asking for #60 of zofran ODT last refill 6/2/2020. Pt was in ED on 6/21 for alcohol withdrawal.  Please advise.

## 2020-06-25 NOTE — TELEPHONE ENCOUNTER
Santiago Norris MD  [facility] 16 minutes ago (3:06 PM)      Okay.  Apparently it was refilled under the non-dissolvable which she does not tolerate previously     Medication sent to pharmacy.

## 2020-07-10 DIAGNOSIS — F41.9 ANXIETY: ICD-10-CM

## 2020-07-10 RX ORDER — DIAZEPAM 5 MG/1
5 TABLET ORAL EVERY 8 HOURS PRN
Qty: 30 TABLET | Refills: 0 | Status: SHIPPED | OUTPATIENT
Start: 2020-07-10 | End: 2020-09-01

## 2020-07-11 ENCOUNTER — TELEPHONE (OUTPATIENT)
Dept: FAMILY MEDICINE CLINIC | Facility: CLINIC | Age: 50
End: 2020-07-11

## 2020-07-11 RX ORDER — ONDANSETRON 4 MG/1
4 TABLET, ORALLY DISINTEGRATING ORAL EVERY 8 HOURS PRN
Qty: 60 TABLET | Refills: 0 | Status: SHIPPED | OUTPATIENT
Start: 2020-07-11 | End: 2020-09-01

## 2020-07-11 NOTE — TELEPHONE ENCOUNTER
Pt called with fatigue symptoms of not feeling well and \"needing her meds badly\"   Pt denies etoh intake and said she has been out of diazepam for a month and needs it suddenly due to her \"liver condition\"   Pt reports last etoh ingestion 6 months ago

## 2020-07-14 ENCOUNTER — TELEPHONE (OUTPATIENT)
Dept: FAMILY MEDICINE CLINIC | Facility: CLINIC | Age: 50
End: 2020-07-14

## 2020-07-14 NOTE — TELEPHONE ENCOUNTER
Returned pt's call,  Pt states hand/feet were numb when she spoke to LE, but feels better now. Still has bruising, states fell off neighbor's porch last year and since states still falls often. Thinks it is due to her medications. Advised ER visit.  Pt re

## 2020-07-20 ENCOUNTER — TELEPHONE (OUTPATIENT)
Dept: CASE MANAGEMENT | Age: 50
End: 2020-07-20

## 2020-07-22 ENCOUNTER — TELEPHONE (OUTPATIENT)
Dept: FAMILY MEDICINE CLINIC | Facility: CLINIC | Age: 50
End: 2020-07-22

## 2020-07-22 NOTE — TELEPHONE ENCOUNTER
Just a FYI pt is refusing ov or Er at this time. Spoke to pt she states she had 2 seizures last week pt states she hit her head. Pt states she did not go to ER and does not want to go. Pt states she has been having seizures for years. Pt refusing treatment for seizures. Pt slurring words,admits to drinking. Pt refusing any emergent treatment at this time. Pt states she needs to schedule a medicare appt with Dr Joseph Roberson. Advised pt she has appt  7/28/2020 with Dr Joseph Roberson. Pt verbalized understanding.

## 2020-07-28 ENCOUNTER — VIRTUAL PHONE E/M (OUTPATIENT)
Dept: FAMILY MEDICINE CLINIC | Facility: CLINIC | Age: 50
End: 2020-07-28
Payer: MEDICARE

## 2020-07-28 ENCOUNTER — TELEPHONE (OUTPATIENT)
Dept: CASE MANAGEMENT | Age: 50
End: 2020-07-28

## 2020-07-28 DIAGNOSIS — M15.9 PRIMARY OSTEOARTHRITIS INVOLVING MULTIPLE JOINTS: Primary | ICD-10-CM

## 2020-07-28 PROCEDURE — 99443 PHONE E/M BY PHYS 21-30 MIN: CPT | Performed by: FAMILY MEDICINE

## 2020-07-28 RX ORDER — DICLOFENAC SODIUM 300 MG/G
1 CREAM TOPICAL 2 TIMES DAILY
Qty: 2500 G | Refills: 1 | Status: SHIPPED | OUTPATIENT
Start: 2020-07-28 | End: 2021-12-13 | Stop reason: ALTCHOICE

## 2020-07-28 NOTE — PROGRESS NOTES
Virtual Telephone Check-In    Josey Rawls verbally consents to a Virtual/Telephone Check-In visit on 07/28/20. Patient understands and accepts financial responsibility for any deductible, co-insurance and/or co-pays associated with this service. Leaking of urine    • Loss of appetite    • Migraines    • Mouth sores    • Nausea    • Night sweats    • OSTEOARTHRITIS     back, knees and hip   • Osteoarthritis    • Other conditions of brain(348.89)    • OTHER DISEASES     endometriosis   • OTHER DISEA cessation. 168 each 1   • Multiple Vitamin (MULTI-VITAMIN DAILY) Oral Tab Take 1 tablet by mouth daily. • Cholecalciferol (VITAMIN D) 2000 units Oral Cap Take 5,000 Units by mouth daily. ALLERGIES:   Bupropion Hcl; Neurontin [Gabapentin];  Pcn [ effort was taken to allow for sufficient and adequate time. This billing was spent on reviewing labs, medications, radiology tests and decision making. Appropriate medical decision-making and tests are ordered as detailed in the plan of care above.

## 2020-08-12 ENCOUNTER — TELEPHONE (OUTPATIENT)
Dept: CASE MANAGEMENT | Age: 50
End: 2020-08-12

## 2020-08-17 ENCOUNTER — VIRTUAL PHONE E/M (OUTPATIENT)
Dept: FAMILY MEDICINE CLINIC | Facility: CLINIC | Age: 50
End: 2020-08-17
Payer: MEDICARE

## 2020-08-17 DIAGNOSIS — F10.10 ALCOHOL ABUSE: ICD-10-CM

## 2020-08-17 DIAGNOSIS — R11.0 CHRONIC NAUSEA: ICD-10-CM

## 2020-08-17 DIAGNOSIS — F41.9 ANXIETY AND DEPRESSION: ICD-10-CM

## 2020-08-17 DIAGNOSIS — Z12.31 BREAST CANCER SCREENING BY MAMMOGRAM: ICD-10-CM

## 2020-08-17 DIAGNOSIS — T74.31XA ADULT SUBJECT TO EMOTIONAL ABUSE, INITIAL ENCOUNTER: ICD-10-CM

## 2020-08-17 DIAGNOSIS — E55.9 VITAMIN D DEFICIENCY: ICD-10-CM

## 2020-08-17 DIAGNOSIS — Z00.00 MEDICARE ANNUAL WELLNESS VISIT, SUBSEQUENT: Primary | ICD-10-CM

## 2020-08-17 DIAGNOSIS — F32.A ANXIETY AND DEPRESSION: ICD-10-CM

## 2020-08-17 RX ORDER — OMEPRAZOLE 40 MG/1
40 CAPSULE, DELAYED RELEASE ORAL DAILY
Qty: 30 CAPSULE | Refills: 1 | Status: SHIPPED | OUTPATIENT
Start: 2020-08-17 | End: 2020-09-10

## 2020-08-17 RX ORDER — NALTREXONE HYDROCHLORIDE 50 MG/1
TABLET, FILM COATED ORAL
Qty: 60 TABLET | Refills: 1 | Status: ON HOLD | OUTPATIENT
Start: 2020-08-17 | End: 2021-11-27
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
## 2020-08-18 ENCOUNTER — VIRTUAL PHONE E/M (OUTPATIENT)
Dept: FAMILY MEDICINE CLINIC | Facility: CLINIC | Age: 50
End: 2020-08-18
Payer: MEDICARE

## 2020-08-18 DIAGNOSIS — Z53.29 NO-SHOW FOR APPOINTMENT: Primary | ICD-10-CM

## 2020-08-18 NOTE — PROGRESS NOTES
At 1018 I called the first number listed for the office visit. There was no answer. At 1021 I called the second number listed for the office visit. Her mother answered the phone. States that Josh Orozco  is sleeping.

## 2020-08-29 DIAGNOSIS — F41.9 ANXIETY: ICD-10-CM

## 2020-09-01 RX ORDER — DIAZEPAM 5 MG/1
5 TABLET ORAL EVERY 8 HOURS PRN
Qty: 30 TABLET | Refills: 0 | Status: SHIPPED | OUTPATIENT
Start: 2020-09-01 | End: 2020-10-20

## 2020-09-01 RX ORDER — ONDANSETRON 4 MG/1
TABLET, ORALLY DISINTEGRATING ORAL
Qty: 60 TABLET | Refills: 0 | Status: SHIPPED | OUTPATIENT
Start: 2020-09-01 | End: 2020-09-17

## 2020-09-02 NOTE — PROGRESS NOTES
Virtual Telephone Check-In    Jimmy Caller verbally consents to a Virtual/Telephone Check-In visit on 09/02/20. Patient understands and accepts financial responsibility for any deductible, co-insurance and/or co-pays associated with this service. (2001, 2008, 2009).    • HEADACHES     migraine   • Heart palpitations    • Hemorrhoids    • High cholesterol    • History of stomach ulcers    • Hoarseness, chronic    • IBS (irritable bowel syndrome)    • Indigestion    • Leaking of urine    • Loss of ernesto • Omeprazole 40 MG Oral Capsule Delayed Release Take 1 capsule (40 mg total) by mouth daily. 30 capsule 1   • Diclofenac Sodium 3 % External Cream Apply 1 Application topically 2 (two) times daily.  2500 g 1   • SUCRALFATE 1 g Oral Tab TAKE 1 TABLET (1 G and/or video communication. This has been done in good analilia to provide continuity of care in the best interest of the provider-patient relationship, due to the ongoing public health crisis/national emergency and because of restrictions of visitation.   Anthony House

## 2020-09-09 DIAGNOSIS — F10.10 ALCOHOL ABUSE: ICD-10-CM

## 2020-09-10 RX ORDER — OMEPRAZOLE 40 MG/1
CAPSULE, DELAYED RELEASE ORAL
Qty: 30 CAPSULE | Refills: 1 | Status: SHIPPED | OUTPATIENT
Start: 2020-09-10 | End: 2020-11-05

## 2020-09-15 ENCOUNTER — TELEPHONE (OUTPATIENT)
Dept: FAMILY MEDICINE CLINIC | Facility: CLINIC | Age: 50
End: 2020-09-15

## 2020-09-15 NOTE — TELEPHONE ENCOUNTER
Dr Katherine Aldana would llke patient to schedule a phone or video visit.  Please call to schedule one

## 2020-09-15 NOTE — TELEPHONE ENCOUNTER
Dr. Pepe Baird to patient - she is taking Omeprazole 2-3 x a day for nausea and heartburn, also using Peptobismal for diarrhea. She picked up a refill of Omeprazole on 9/10 and she states almost out of them.  Also stoppedd the Venlafaxine because it ma

## 2020-09-15 NOTE — TELEPHONE ENCOUNTER
Pt notified.   Last refill was given by [facility]P on 9/10/20 #30 + 1 refill    Pt wants to change her venlafaxine,  appt made to discuss with [facility]

## 2020-09-15 NOTE — TELEPHONE ENCOUNTER
Patient is requesting a refill of OMEPRAZOLE 40 MG Oral Capsule Delayed Release    States she has a lot going on, mom has cancer, she has not been able to see her.     States that she has been taking the OMEPRAZOLE 40 MG Oral Capsule Delayed Release one to

## 2020-09-17 ENCOUNTER — VIRTUAL PHONE E/M (OUTPATIENT)
Dept: FAMILY MEDICINE CLINIC | Facility: CLINIC | Age: 50
End: 2020-09-17
Payer: MEDICARE

## 2020-09-17 DIAGNOSIS — K58.0 IRRITABLE BOWEL SYNDROME WITH DIARRHEA: ICD-10-CM

## 2020-09-17 DIAGNOSIS — R10.9 CHRONIC ABDOMINAL PAIN: Primary | ICD-10-CM

## 2020-09-17 DIAGNOSIS — G89.29 CHRONIC ABDOMINAL PAIN: Primary | ICD-10-CM

## 2020-09-17 PROCEDURE — 99213 OFFICE O/P EST LOW 20 MIN: CPT | Performed by: FAMILY MEDICINE

## 2020-09-17 RX ORDER — ONDANSETRON 4 MG/1
4 TABLET, ORALLY DISINTEGRATING ORAL EVERY 8 HOURS PRN
Qty: 90 TABLET | Refills: 3 | Status: SHIPPED | OUTPATIENT
Start: 2020-09-17 | End: 2021-12-13

## 2020-09-17 NOTE — PROGRESS NOTES
Virtual Telephone Check-In    Ariela Pac verbally consents to a Virtual/Telephone Check-In visit on 09/17/20. Patient understands and accepts financial responsibility for any deductible, co-insurance and/or co-pays associated with this service. HEADACHES     migraine   • Heart palpitations    • Hemorrhoids    • High cholesterol    • History of stomach ulcers    • Hoarseness, chronic    • IBS (irritable bowel syndrome)    • Indigestion    • Leaking of urine    • Loss of appetite    • Migraines MOUTH EVERY 8 (EIGHT) HOURS AS NEEDED FOR ANXIETY.  30 tablet 0   • ONDANSETRON 4 MG Oral Tablet Dispersible TAKE 1 TABLET BY MOUTH EVERY 8 HOURS AS NEEDED FOR NAUSEA 60 tablet 0   • Naltrexone HCl 50 MG Oral Tab Once daily x 1 week, then twice daily 60 tab tendency with alcohol and I do not want her on another medication that is addictive. She is also had allergy to 3 other narcotics. Again she cannot take tramadol. She cannot have NSAIDs because of her stomach. She failed Cymbalta therapy.       Please n

## 2020-10-14 ENCOUNTER — TELEPHONE (OUTPATIENT)
Dept: FAMILY MEDICINE CLINIC | Facility: CLINIC | Age: 50
End: 2020-10-14
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
## 2020-11-05 DIAGNOSIS — F10.10 ALCOHOL ABUSE: ICD-10-CM

## 2020-11-05 RX ORDER — OMEPRAZOLE 40 MG/1
CAPSULE, DELAYED RELEASE ORAL
Qty: 30 CAPSULE | Refills: 1 | Status: SHIPPED | OUTPATIENT
Start: 2020-11-05 | End: 2020-11-28

## 2020-11-28 ENCOUNTER — TELEPHONE (OUTPATIENT)
Dept: FAMILY MEDICINE CLINIC | Facility: CLINIC | Age: 50
End: 2020-11-28

## 2020-11-28 DIAGNOSIS — F10.10 ALCOHOL ABUSE: ICD-10-CM

## 2020-11-28 DIAGNOSIS — R10.9 CHRONIC ABDOMINAL PAIN: Primary | ICD-10-CM

## 2020-11-28 DIAGNOSIS — G89.29 CHRONIC ABDOMINAL PAIN: ICD-10-CM

## 2020-11-28 DIAGNOSIS — G89.29 CHRONIC ABDOMINAL PAIN: Primary | ICD-10-CM

## 2020-11-28 DIAGNOSIS — R10.9 CHRONIC ABDOMINAL PAIN: ICD-10-CM

## 2020-11-28 RX ORDER — OMEPRAZOLE 40 MG/1
40 CAPSULE, DELAYED RELEASE ORAL 2 TIMES DAILY
Qty: 60 CAPSULE | Refills: 2 | Status: SHIPPED | OUTPATIENT
Start: 2020-11-28 | End: 2020-11-30

## 2020-11-28 NOTE — TELEPHONE ENCOUNTER
Page received 4022. Returned call [phone]. No answer. LMOM  She has omeprazole refilled on 11/5 with 30 and additional refill and ondansetron on 9/17 #90 with 3 additional refills. Not sure what else she may need. I'll try back later.       Called her back

## 2020-11-30 RX ORDER — OMEPRAZOLE 40 MG/1
CAPSULE, DELAYED RELEASE ORAL
Qty: 30 CAPSULE | Refills: 1 | Status: SHIPPED | OUTPATIENT
Start: 2020-11-30 | End: 2021-03-18

## 2021-02-24 ENCOUNTER — TELEPHONE (OUTPATIENT)
Dept: FAMILY MEDICINE CLINIC | Facility: CLINIC | Age: 51
End: 2021-02-24

## 2021-02-24 NOTE — TELEPHONE ENCOUNTER
Slurring her words, falling, and incontinence. Could be normal pressure hydrocephalus. She needs an ER visit. We should consider calling 911 and sending them to the house if she is snoring and unable to make proper decisions.   Normal pressure hydrocepha

## 2021-02-24 NOTE — TELEPHONE ENCOUNTER
Pt instructed to go to ER. Pt states she has a lot to do today  And doesn't know if she will go today. Pt instructed due to her sx Dr Anna Kaur wants her to go to ER for evaluation. Pt verbalized understanding.

## 2021-02-24 NOTE — TELEPHONE ENCOUNTER
Spoke to pt she states she wants us to contact her insurance company so they will send someone to help her up and down the stairs in her home and help her clean around the house. Pt states she needs to pack her stuff and move she is being kicked out of the

## 2021-03-18 DIAGNOSIS — G89.29 CHRONIC ABDOMINAL PAIN: ICD-10-CM

## 2021-03-18 DIAGNOSIS — R10.9 CHRONIC ABDOMINAL PAIN: ICD-10-CM

## 2021-03-18 DIAGNOSIS — F10.10 ALCOHOL ABUSE: ICD-10-CM

## 2021-03-18 RX ORDER — OMEPRAZOLE 40 MG/1
CAPSULE, DELAYED RELEASE ORAL
Qty: 180 CAPSULE | Refills: 0 | Status: SHIPPED | OUTPATIENT
Start: 2021-03-18 | End: 2021-12-13

## 2021-03-18 NOTE — TELEPHONE ENCOUNTER
Rx Request  OMEPRAZOLE 40 MG Oral Capsule Delayed Release    Disp:     30               R: 1    Associated Dx: Alcohol abuse, Chronic abdominal pain     Last Refilled: 11/30/2020    Last Visit: 10/20/2020

## 2021-03-23 ENCOUNTER — TELEPHONE (OUTPATIENT)
Dept: FAMILY MEDICINE CLINIC | Facility: CLINIC | Age: 51
End: 2021-03-23

## 2021-03-23 NOTE — TELEPHONE ENCOUNTER
Pt returned call. Verbalized understanding. Will continue to elevate legs/ ice/heat/ baths/showes, etc. And will see RC next week.

## 2021-03-23 NOTE — TELEPHONE ENCOUNTER
Pt is coming 03/29/21 from [facility] to see Dr. Myles Mcmillan for leg pain.  Pt wants to know what she can do in the meantime for the pain     Please advise

## 2021-03-23 NOTE — TELEPHONE ENCOUNTER
She has a very long past medical history. Our last telephone visit in the fall I suspect that the increased pain may be due to her anxiety.   I am uncomfortable prescribing pain medicine over the phone without performing an exam due to her extensive medica
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
## 2021-03-30 ENCOUNTER — TELEPHONE (OUTPATIENT)
Dept: FAMILY MEDICINE CLINIC | Facility: CLINIC | Age: 51
End: 2021-03-30

## 2021-03-30 NOTE — TELEPHONE ENCOUNTER
Pt called and stated she is on hospice in [place] and will not be coming back and has a doctor taking over her care in [place]. Wanted to thank Dr Daisy Shelley for his care.

## 2021-05-17 ENCOUNTER — TELEPHONE (OUTPATIENT)
Dept: FAMILY MEDICINE CLINIC | Facility: CLINIC | Age: 51
End: 2021-05-17
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
## 2021-08-05 NOTE — ED INITIAL ASSESSMENT (HPI)
Patient requesting alcohol detox. Patient relates she took one diazepam today for the \"shakes\". Patient relates her last drink was while enroute to the emergency department. Denies SI/HI.

## 2021-08-06 NOTE — CM/SW NOTE
TARIKM had discussion with patient regarding plan of care. Patient is now telling me that she wants detox. She states that she \"went to [facility] after discharge but was told she wasn't medically cleared\". She states \"I didn't have the paperwork\".  I offered to

## 2021-08-06 NOTE — ED INITIAL ASSESSMENT (HPI)
51YF c/c of detox Pt state that she wants detox from ETOH Pt was seen here earlier today. Left went to [facility].  Pt left [facility] with out an assessment and immediatly return to ER

## 2021-08-06 NOTE — ED QUICK NOTES
Patient continues to try and get out of the stretcher. Patient has been updated on the plan of care, but remains agitated. Dr Petar Harrison notified and Ativan reordered.

## 2021-08-06 NOTE — ED NOTES
Provided pt with referrals for detox and residential/outpatient treatment programs. Recommended that pt look into residential treatment programs due to reported pattern of use/symptoms of alcohol abuse. Pt states that she will look into [place].  Also jhonathan

## 2021-08-06 NOTE — ED QUICK NOTES
Pt decided  to leave to have a cigarette Pt did not commincate this to nursing staff. Pt did take all her belongings with her.  MD is aware

## 2021-08-06 NOTE — ED PROVIDER NOTES
Patient Seen in: [facility] Emergency Department      History   Patient presents with:  Eval-D    Stated Complaint:     HPI/Subjective:   HPI    Patient returns is here after being discharged several hours ago for help with detox.     She was initial • Uncomfortable fullness after meals    • Wears partial dentures     upper partial              Past Surgical History:   Procedure Laterality Date   • BACK SURGERY     • CHOLECYSTECTOMY     • COLONOSCOPY  2009    colon polyps   • COLONOSCOPY  2010    mul Normal effort. No accessory muscle use. No clubbing, no cyanosis. Abdominal: Soft. There is no tenderness. There is no guarding. Musculoskeletal: No swelling, deformity or ecchymosis.     Neurological: Pt is alert and oriented to person, place, and t

## 2021-08-06 NOTE — ED QUICK NOTES
Lidia RN has been at bedside for approximately 45 minutes speaking with the patient and thoroughly explaining the plan of care.

## 2021-08-06 NOTE — ED QUICK NOTES
Patient now much more calm and cooperative. Patient easily awakened when sleeping. Patient assisted to postion of comfort.
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
## 2021-08-06 NOTE — ED QUICK NOTES
Patient had been provided with a meal tray and it was disposed of after the patient was finished with it. Patient again had to be escorted back into her stretcher and the plan of care was once again reiterated with her.  Patient was cursing and raising her

## 2021-08-06 NOTE — ED QUICK NOTES
Patient has become very agitated and is attempting to leave the ED. Patient continues to slur her words and has a very unsteady gait thus requiring staff to escort her back to the stretcher.  Dr Janay Cowan ordered Ativan IV which was administered to help calm

## 2021-08-06 NOTE — ED PROVIDER NOTES
Patient is now awake, alert, clinically sober. Able to dress herself and ambulate in a straight line. Appropriate for discharge. She denies any medical complaints. No SI or HI.   Be discharged home now that she is clinically sober as planned by Dr. Destiney Wong

## 2021-10-13 ENCOUNTER — TELEPHONE (OUTPATIENT)
Dept: FAMILY MEDICINE CLINIC | Facility: CLINIC | Age: 51
End: 2021-10-13

## 2021-10-29 NOTE — PROGRESS NOTES
Virtual Telephone Check-In    Jaren Oro verbally consents to a Virtual/Telephone Check-In visit on 10/29/21. Patient understands and accepts financial responsibility for any deductible, co-insurance and/or co-pays associated with this service. stomach ulcers    • Hoarseness, chronic    • IBS (irritable bowel syndrome)    • Migraines    • OSTEOARTHRITIS     back, knees and hip   • OTHER DISEASES     endometriosis   • OTHER DISEASES     colon polyp-will get path for me to determine f/up   • OTHER each 1   • Multiple Vitamin (MULTI-VITAMIN DAILY) Oral Tab Take 1 tablet by mouth daily. • Cholecalciferol (VITAMIN D) 2000 units Oral Cap Take 5,000 Units by mouth daily.          ALLERGIES:   Bupropion Hcl, Neurontin [Gabapentin], Pcn [Penicillins], T reviewing test results completed on the same day. Please note that the following visit was completed using two-way, real-time interactive audio and/or video communication.   This has been done in good analilia to provide continuity of care in the best

## 2021-11-01 ENCOUNTER — TELEPHONE (OUTPATIENT)
Dept: FAMILY MEDICINE CLINIC | Facility: CLINIC | Age: 51
End: 2021-11-01

## 2021-11-01 NOTE — TELEPHONE ENCOUNTER
Pt will be faxing over the medication list that she is on from the place that she will be leaving tomorrow. The pt said [facility] agreed to fill her meds.     [address]    [phone]

## 2021-11-02 NOTE — TELEPHONE ENCOUNTER
Paperwork received. Put in [address] triage Tray.   Esther Silva wanted us to have this incase she needs refills prior to her appt with CZ

## 2021-11-05 DIAGNOSIS — F41.9 ANXIETY: ICD-10-CM

## 2021-11-05 RX ORDER — BUTALBITAL, ACETAMINOPHEN AND CAFFEINE 300; 40; 50 MG/1; MG/1; MG/1
1 CAPSULE ORAL EVERY 4 HOURS PRN
Qty: 84 CAPSULE | Refills: 0 | Status: CANCELLED | OUTPATIENT
Start: 2021-11-05 | End: 2021-11-19

## 2021-11-05 RX ORDER — DIAZEPAM 10 MG/1
10 TABLET ORAL 3 TIMES DAILY
Qty: 90 TABLET | Refills: 0 | Status: SHIPPED | OUTPATIENT
Start: 2021-11-05 | End: 2021-11-09 | Stop reason: ALTCHOICE

## 2021-11-05 RX ORDER — BUTALBITAL AND ACETAMINOPHEN 50; 300 MG/1; MG/1
1 CAPSULE ORAL EVERY 6 HOURS PRN
Qty: 30 CAPSULE | Refills: 0 | Status: SHIPPED
Start: 2021-11-05

## 2021-11-05 RX ORDER — DIAZEPAM 10 MG/1
10 TABLET ORAL EVERY 12 HOURS PRN
Qty: 60 TABLET | Refills: 1 | Status: CANCELLED | OUTPATIENT
Start: 2021-11-05

## 2021-11-05 RX ORDER — LEVOTHYROXINE SODIUM 0.05 MG/1
50 TABLET ORAL
Qty: 30 TABLET | Refills: 2 | Status: SHIPPED | OUTPATIENT
Start: 2021-11-05 | End: 2021-11-17

## 2021-11-05 NOTE — TELEPHONE ENCOUNTER
Approve/deny [id number] TID anxiety  Butalbital-acetaminophen 50-300mg every 6hrs PRN  Levothyroxine 50mcg    Pt sent med list in [address] office,

## 2021-11-05 NOTE — TELEPHONE ENCOUNTER
Pt needs refill of diazepam 10 mg    butalbital    Levothyroxine   50 mcg    Please send to [facility] in [facility] on [address]

## 2021-11-08 ENCOUNTER — TELEPHONE (OUTPATIENT)
Dept: FAMILY MEDICINE CLINIC | Facility: CLINIC | Age: 51
End: 2021-11-08

## 2021-11-08 NOTE — TELEPHONE ENCOUNTER
Patient calling stating that she needs to get into a skilled nursing facility. Also stated that she is concerned that she may miss her appt on Friday w/ doctor. Please advise.

## 2021-11-08 NOTE — TELEPHONE ENCOUNTER
Patient calling stating that the pharmacy told her that she should not be taking diazapam.  She is requesting ATIVAN. She also states that she needs inhaler. Stated a bag of her medications went missing.     Also indicated that she is swelling up all ov

## 2021-11-09 ENCOUNTER — TELEPHONE (OUTPATIENT)
Dept: FAMILY MEDICINE CLINIC | Facility: CLINIC | Age: 51
End: 2021-11-09

## 2021-11-09 NOTE — TELEPHONE ENCOUNTER
Luis Alberto Singh MD  [facility] 49 minutes ago (8:55 AM)         Please call the pharmacy to confirm this. Eden Estrella just sent the diazepam 4 days ago. Leigh David she has picked this up then I refuse to refill Ativan.     Message text

## 2021-11-09 NOTE — TELEPHONE ENCOUNTER
Spoke with pharmacist to see what issue is. He states pt said she doesn't want to take diazepam d/t having cirrhosis and she asked pharmacy if there is something else she can take. They are recommending lorazepam instead. Would you like to switch?

## 2021-11-09 NOTE — TELEPHONE ENCOUNTER
I called pharmacy and they stated pt does not want to take diazepam d/t cirrhosis. They did not tell me they told her not to. They are recommending she be switched to lorazepam instead.      Spoke to pharm again to confirm whether or not diazepam Rx from

## 2021-11-09 NOTE — TELEPHONE ENCOUNTER
Pharmacy informed pt should not be taking the diazepam due to liver disease and recommend she be put on ativan instead. Please advise.

## 2021-11-17 ENCOUNTER — TELEPHONE (OUTPATIENT)
Dept: FAMILY MEDICINE CLINIC | Facility: CLINIC | Age: 51
End: 2021-11-17

## 2021-11-17 RX ORDER — LEVOTHYROXINE SODIUM 0.05 MG/1
TABLET ORAL
Qty: 90 TABLET | Refills: 0 | Status: SHIPPED | OUTPATIENT
Start: 2021-11-17

## 2021-11-17 NOTE — TELEPHONE ENCOUNTER
Pt informed that Lorazepam and Levothyroxine were sent to [facility] previously. Pt requesting Albuterol inhaler for asthma she states she had 1 at the facility. Please advise.   Last inhaler I see was Proair filled 7/2018

## 2021-11-17 NOTE — TELEPHONE ENCOUNTER
She never answered for her telephone visit last week. Can you have her reschedule so that we can make sure if she needs this albuterol or if it should be discontinued. Maybe you can complete an asthma control test prior to her visit.

## 2021-11-18 ENCOUNTER — APPOINTMENT (OUTPATIENT)
Dept: GENERAL RADIOLOGY | Facility: HOSPITAL | Age: 51
DRG: 897 | End: 2021-11-18
Attending: EMERGENCY MEDICINE
Payer: MEDICARE

## 2021-11-18 PROCEDURE — 71045 X-RAY EXAM CHEST 1 VIEW: CPT | Performed by: EMERGENCY MEDICINE

## 2021-11-19 PROBLEM — F10.230 ALCOHOL WITHDRAWAL SYNDROME WITHOUT COMPLICATION (HCC): Status: ACTIVE | Noted: 2021-11-19

## 2021-11-19 PROBLEM — F10.930 ALCOHOL WITHDRAWAL SYNDROME WITHOUT COMPLICATION (HCC): Status: ACTIVE | Noted: 2021-11-19

## 2021-11-19 NOTE — ED QUICK NOTES
Orders for admission, patient is aware of plan and ready to go upstairs. Any questions, please call ED RN zuly  at extension [phone].    Patient presents with:  Eval-D      Patient AO x 3  Ambulation: ambulatory  Belongings: accompanying patient  Medications:

## 2021-11-19 NOTE — ED QUICK NOTES
Pt awake and alert. O2 RA <94%. Pt sts she is ordering an Peggi Geri and will wait for ride in [facility]. Dr Jason Rodriguez aware. Pt will be discharged.
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
## 2021-11-19 NOTE — ED PROVIDER NOTES
Patient Seen in: [facility] AND [facility] Emergency Department      History   Patient presents with:  Alcohol Intoxication    Stated Complaint: ETOH    Subjective:   HPI    55 y/o female with MMP including alcohol abuse who presents by EMS for eval of \"pain al SURGICAL HISTORY  2008    left oophrectomy due to cyst and CHELLY   • OTHER SURGICAL HISTORY  2008    right ovarian cyst removed not ovary due to adhered to bowel   • OTHER SURGICAL HISTORY  2009    right oophrectomy    • TUBAL LIGATION  95                Soc - No data to display         CXR 23:51 single AP view      IMPRESSION:  No acute cardiopulmonary disease. Results faxed/electronically transmitted to the ER and radiology department at 1:20 AM ETDenise Farris M.D.   This report has been electroni

## 2021-11-19 NOTE — ED INITIAL ASSESSMENT (HPI)
Pt to ED via EMS for multiple calls to 911 for alcohol intoxication, pt had half of a bottle of empty bottle of vodka next to her on EMS arrival. BG 96. Hx liver disease.

## 2021-11-19 NOTE — ED INITIAL ASSESSMENT (HPI)
PATIENT TO ED VIA EMS PATIENT REPORTED WAS  [address] FOR ETOH ABUSE, PATIENT BINGE DRINKING X 4 DAYS, PATIENT NOW ARRIVES TO ED CO OF  ETOH WITHDRAWAL LAST DRINK X YESTERDAY.  DENIES PAIN DENIES SI/HI PATIENT REPORTED DOES NOT WANT DETOX

## 2021-11-20 NOTE — H&P
[facility]    PATIENT'S NAME: Arnold Nadine CEVALLOS   ATTENDING PHYSICIAN: Amina Sorto.  Jack Deng MD   PATIENT ACCOUNT#:   [id number]    LOCATION:  [place] [address]  MEDICAL RECORD #:   [id number]       YOB: 1970  ADMISSION DATE:       1 since this morning. Her last drink, per her report, was yesterday. She feels nauseated. Other 12-point review of systems is negative. PHYSICAL EXAMINATION:    GENERAL:  Alert, oriented to time, place, and person. Tremulous, tachycardic.    VITAL

## 2021-11-20 NOTE — PLAN OF CARE
CIWA: 4-7. Patient is alert and oriented x3, forgetful at times. Remote tele on. Denies pain. Ambulates with standby assist. Frequent rounding. Call light in reach. Patient updated on plan of care. Fall precautions in place.      Problem: Patient Centered C activity and pre-medicate as appropriate  Outcome: Progressing     Problem: SAFETY ADULT - FALL  Goal: Free from fall injury  Description: INTERVENTIONS:  - Assess pt frequently for physical needs  - Identify cognitive and physical deficits and behaviors t oxygen as ordered  - Monitor patient for seizure activity, document and report duration and description of seizure to MD/LIP  - If seizure occurs, turn patient to side and suction secretions as needed  - Reorient patient post seizure  - Seizure pads on all

## 2021-11-20 NOTE — PLAN OF CARE
Patient wants to leave AMA. Dr. Shoshana Davis paged. Dr. Shoshana Davis came to bedside and talked to patient. He determined she was alert and oriented to make decision. Discussed the harm in leaving such as cold weather, potential seizure.  Marty Martinez was also called to talk wi
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
## 2021-11-26 NOTE — ED INITIAL ASSESSMENT (HPI)
Patient presents via EMS with complaints of alcohol withdraw. Patient states her last drink was this morning. Patient states she usually drinks a pint of alcohol per day.

## 2021-11-27 PROBLEM — R00.0 TACHYCARDIA: Status: ACTIVE | Noted: 2021-11-27

## 2021-11-27 PROBLEM — F33.1 MODERATE EPISODE OF RECURRENT MAJOR DEPRESSIVE DISORDER (HCC): Status: ACTIVE | Noted: 2021-11-27

## 2021-11-27 NOTE — PLAN OF CARE
Pt admitted to [facility] from ED at approx 0140 for Tx of ETOH withdrawal. Pt A/Ox4. VSS on RA. NSR on remote tele. Admission CIWA score 5. Pt calm/withdrawn, resting in bed w/ eyes closed.  Reports nausea, headache, anxiety at times; prn PO ativan provided pe emotional support with 1:1 interaction with staff  - Encourage 12-Step involvement or recovery focused alternative  Outcome: Progressing     Problem: PAIN - ADULT  Goal: Verbalizes/displays adequate comfort level or patient's stated pain goal  Description: discharge as needed  - Consider post-discharge preferences of patient/family/discharge partner  - Complete POLST form as appropriate  - Assess patient's ability to be responsible for managing their own health  - Refer to Case Management Department for coor

## 2021-11-27 NOTE — ED QUICK NOTES
Orders for admission, patient is aware of plan and ready to go upstairs. Any questions, please call ED JULIO Laws Morris  at extension [phone].    Type of COVID test sent: Rapid   COVID Suspicion level: Low    Titratable drug(s) infusing: N/A  Rate: N/A    LOC at ti

## 2021-11-27 NOTE — ED PROVIDER NOTES
Patient Seen in: [facility] AND [facility] Emergency Department    History   Patient presents with:  Eval-D      HPI    79-year-old female presents the ER with complaint of withdrawal symptoms. Patient states she feels anxious and able to sleep.   Patient has a hi CHOLECYSTECTOMY     • COLONOSCOPY  2009    colon polyps   • COLONOSCOPY  2010    multiple dental procedures   • EGD     • HEMORRHOIDECTOMY,INT/EXT,SIMPLE     • HYSTERECTOMY  age 31    endometriosis and ORQUIDEA 4 HPV positive   • OTHER SURGICAL HISTORY  2003 [11/26/21 1728]   /78   Pulse (!) 126   Resp 20   Temp 98.7 °F (37.1 °C)   Temp src Oral   SpO2 97 %   O2 Device None (Room air)       All measures to prevent infection transmission during my interaction with the patient were taken.  The patient was a Sodium 131 (*)     Chloride 93 (*)     All other components within normal limits   HEPATIC FUNCTION PANEL (7) - Abnormal; Notable for the following components:    AST 99 (*)     ALT 86 (*)     Alkaline Phosphatase 240 (*)     Bilirubin, Direct 0.5 (*) tachycardia    Differential Diagnosis/ Diagnostic Considerations: Alcohol withdrawal, alcohol dependence    Medical Record Review: I personally reviewed available prior medical records for any recent pertinent discharge summaries, testing, and procedures a * (Principal) Alcohol withdrawal syndrome without complication ([facility].) U92.415 11/19/2021 Unknown

## 2021-11-27 NOTE — PROGRESS NOTES
35 min spent post mn  dw dr Melvin Guerra who called dr Fco Magaña to see  meds adjusted  uti tx initiatred  Med recon done  Less tachy  Await psych opinion  Check ekg; chest discomfort likely from taking bactrim on empty stomach

## 2021-11-27 NOTE — CONSULTS
[facility]      Report of Psychiatric Consultation    Meliton Garcia Patient Status:  Inpatient    1970 MRN [id number]   Location [facility] 5SW/SE Attending Annie Treviño # 0 PCP Kimberly Hermosillo MD hospitalized with suicide attempt with wrist slit, and also 2009    • Disorder of thyroid    • Endometriosis    • Enlarged lymph node    • Esophageal reflux    • H/O abdominal hysterectomy     had her uterus removed in stages, over the course of 3 surgerie Maternal Grandmother    • Other (stroke syndrome) Maternal Grandfather    • Colon Cancer Paternal Aunt       reports that she has been smoking cigarettes. She has a 20.00 pack-year smoking history.  She has never used smokeless tobacco. She reports current Comment:HA and throat closes  Savella                 OTHER (SEE COMMENTS)    Comment:TABS (Pt does not remember the allergy).   Venlafaxine             NAUSEA ONLY    Comment:Visual changes, cleared up 3 days after stopping             med    MEDICATIONS: affect. Her speech was slow, logical and goal-directed, with long latency responses. She describes her mood as \"sad\", and denied suicidal ideation or psychotic symptoms. She was oriented to [employer] and \"November 27, 2021\".     LABOR
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
## 2021-11-27 NOTE — PLAN OF CARE
Nausea, poor appetite, headaches. Had EKG,  Spoke with Dr Khloe Dias from Psych over video chat.    Problem: Patient Centered Care  Goal: Patient preferences are identified and integrated in the patient's plan of care  Description: Interventions:  - What would pain and request assistance  - Assess pain using appropriate pain scale  - Administer analgesics based on type and severity of pain and evaluate response  - Implement non-pharmacological measures as appropriate and evaluate response  - Consider cultural an needs post-hospital services based on physician/LIP order or complex needs related to functional status, cognitive ability or social support system  Outcome: Progressing

## 2021-11-27 NOTE — ED NOTES
This writer met with patient for telehealth with Dr. Mariella Betts. Informed Consent for Telemedicine Services.

## 2021-11-28 NOTE — PLAN OF CARE
Crying at times, missing her mother who passed away in January from liver failure. Appetite much improved. Declines to get out of bed. Sleeping a lot.    Problem: Patient Centered Care  Goal: Patient preferences are identified and integrated in the patie Administer analgesics based on type and severity of pain and evaluate response  - Implement non-pharmacological measures as appropriate and evaluate response  - Consider cultural and social influences on pain and pain management  - Manage/alleviate anxiety responsible for managing their own health  - Refer to Case Management Department for coordinating discharge planning if the patient needs post-hospital services based on physician/LIP order or complex needs related to functional status, cognitive ability o

## 2021-11-28 NOTE — PLAN OF CARE
Pt's vital signs stable. PRN Fioricet provided for headache and Flexeril for spasms. CIWA protocol maintained. Has not yet required Ativan this shift. Alert and oriented x4. Tele in place, NSR-ST. Tolerating general diet. Voids freely via [address].  Receivi on type and severity of pain and evaluate response  - Implement non-pharmacological measures as appropriate and evaluate response  - Consider cultural and social influences on pain and pain management  - Manage/alleviate anxiety  - Utilize distraction and/ cognitive ability or social support system  Outcome: Progressing

## 2021-11-28 NOTE — PROGRESS NOTES
[facility] - [facility]    Progress Note    Tessa Hidalgo Patient Status:  Inpatient    1970 MRN [id number]   Location [facility] 5SW/SE Attending Annie Treviño Day # 1 PCP Benjamin Wilson MD     Subjective:     C 11/28/2021    ALKPHO 179 (H) 11/28/2021    BILT 0.9 11/28/2021    TP 5.9 (L) 11/28/2021     (H) 11/28/2021    ALT 62 (H) 11/28/2021    PTT 29.2 11/27/2021    INR 1.15 11/27/2021    T4F 1.1 12/13/2019    TSH 10.900 (H) 12/13/2019     11/26/202

## 2021-11-29 ENCOUNTER — APPOINTMENT (OUTPATIENT)
Dept: GENERAL RADIOLOGY | Facility: HOSPITAL | Age: 51
DRG: 894 | End: 2021-11-29
Attending: HOSPITALIST
Payer: MEDICARE

## 2021-11-29 ENCOUNTER — APPOINTMENT (OUTPATIENT)
Dept: ULTRASOUND IMAGING | Facility: HOSPITAL | Age: 51
DRG: 894 | End: 2021-11-29
Attending: HOSPITALIST
Payer: MEDICARE

## 2021-11-29 PROCEDURE — 71046 X-RAY EXAM CHEST 2 VIEWS: CPT | Performed by: HOSPITALIST

## 2021-11-29 PROCEDURE — 76705 ECHO EXAM OF ABDOMEN: CPT | Performed by: HOSPITALIST

## 2021-11-29 NOTE — DIETARY NOTE
ADULT NUTRITION INITIAL ASSESSMENT    Pt is at moderate nutrition risk. Pt does not meet malnutrition criteria.       RECOMMENDATIONS TO MD:  See Nutrition Intervention for diet and ONS specifics specifics     ADMITTING DIAGNOSIS:   Tachycardia [R00.0]  Al Pt anxious and depressed. Reports lost weight in August due to the food at [facility]. Pt reports she likes to drink Ensure Plus x2 daily.       FOOD/NUTRITION RELATED HISTORY:  Appetite: Poor to fair  Intake: Intake appears to be improving however pt reports it is --  0.40*  --  0.36*   CA 8.2*  --  7.5*  --  8.0*   MG 1.6  --  1.6  --  1.5*     --  134*  --  136   K 3.8   < > 2.9* 3.6 3.2*     --  100  --  106   CO2 23.0  --  23.0  --  24.0   PHOS  --   --  2.3*  --  2.9   [facility] 279  --  275  --  279 snacks: Arranged snacks, Encouraged small frequent meals, Encouraged increased PO intake and added ONS  - Medical Food Supplements-RD added Ensure Enlive (350 calories/ 20 g protein each) BID Chocolate between meals per pt preference.  Rational/use of oral

## 2021-11-29 NOTE — DISCHARGE SUMMARY
[address] [place] [address] Discharge Diagnoses: etoh wd    Lace+ Score: 77  59-90 High Risk  29-58 Medium Risk  0-28   Low Risk. TCM Follow-Up Recommendation:  LACE > 58:  High Risk of readmission after discharge from the hospital.tcm fu essential pt

## 2021-11-29 NOTE — PLAN OF CARE
CIWA protocol followed and po ativan given. Patient has very poor po intake and only took some cola today. Patient very anxious to be discharged and encouraged to stay and wait to speak with Dr. Tenisha Mcguire, who was notified that she was anxious to leave.  He at manageable levels  Description: INTERVENTIONS:  - Administer medication as ordered  - Teach and rehearse alternative coping skills  - Provide emotional support with 1:1 interaction with staff  Outcome: Completed     Problem: DRUG ABUSE/DETOX  Goal: Will Completed     Problem: DISCHARGE PLANNING  Goal: Discharge to home or other facility with appropriate resources  Description: INTERVENTIONS:  - Identify barriers to discharge w/pt and caregiver  - Include patient/family/discharge partner in discharge plann

## 2021-11-29 NOTE — PLAN OF CARE
Problem: Patient Centered Care  Goal: Patient preferences are identified and integrated in the patient's plan of care  Description: Interventions:  - What would you like us to know as we care for you?  My mother  in January of liver failure  - Provide appropriate pain scale  - Administer analgesics based on type and severity of pain and evaluate response  - Implement non-pharmacological measures as appropriate and evaluate response  - Consider cultural and social influences on pain and pain management order or complex needs related to functional status, cognitive ability or social support system  Outcome: Progressing       Patient is alert and oriented x 4, complained of severe headache, PRN Fioricet, and Tylenol given at separate times.  VS taken and re

## 2021-11-29 NOTE — PLAN OF CARE
Received call from Hematology/Lab regarding pt's critical absolute neutrophil results of 0.42. Endorsed to day shift RN.

## 2021-11-29 NOTE — PROGRESS NOTES
[facility] - [facility]    Progress Note    Jaren Oro Patient Status:  Inpatient    1970 MRN [id number]   Location [facility] 5SW/SE Attending Annie Treviño Day # 2 PCP Shane Bennett MD     Subjective:     C 11/29/2021    CA 8.0 (L) 11/29/2021    ALB 2.6 (L) 11/29/2021    ALKPHO 161 (H) 11/29/2021    BILT 0.4 11/29/2021    TP 5.6 (L) 11/29/2021    AST 61 (H) 11/29/2021    ALT 55 11/29/2021    PTT 29.2 11/27/2021    INR 1.15 11/27/2021    T4F 0.8 11/28/2021 follow. 5.       Elevated liver function tests, suspect a degree of alcoholic hepatitis. Continue to follow. 6.       DVT prophylaxis. SCDs, subcutaneous heparin. Will need to monitor this given her marginal platelet count.   7.       History of nausea

## 2021-11-30 NOTE — PAYOR COMM NOTE
she curiously offered to stay if I allowed her to smoke; psych informed that she was leaving fab  I dw pt the conditions for me to dc her tomorrow if she stayed and did not worsen    She stated she still wants to leave.  FAB paperwork given to her and write

## 2021-11-30 NOTE — PAYOR COMM NOTE
--------------  ADMISSION REVIEW     Payor: Jori Lopez  Subscriber #:  [address] [facility] Number: [id number]    Admit date: 11/27/21  Admit time:  1:42 AM       REVIEW DOCUMENTATION:     ED Provider Notes      ED Provider Notes signed by Elliott Kelley Migraines    • OSTEOARTHRITIS     back, knees and hip   • OTHER DISEASES     endometriosis   • OTHER DISEASES     colon polyp-will get path for me to determine f/up   • OTHER DISEASES age 23     pyelonephritis   • Personal history of adult physical and sex Substance and Sexual Activity      Alcohol use: Yes        Comment: 1 pint of vodka daily for the past 5-6 days      Drug use: No        Comment: quit marijuana june 2010    Other Topics      Concerns:        Caffeine Concern: Yes        Exercise: No motion. Cervical back: Normal range of motion and neck supple. Skin:     General: Skin is warm and dry. Neurological:      General: No focal deficit present. Mental Status: She is alert and oriented to person, place, and time.       Deep Tendo 11/26/21 1747)   ondansetron (ZOFRAN) injection 4 mg (4 mg Intravenous Given 11/26/21 2206)   diazepam (VALIUM) injection 5 mg (5 mg Intravenous Given 11/26/21 2206)         MDM      11/26/21  2245 11/26/21  2300 11/26/21  2330 11/27/21  0000   BP:  124/81 and medics and excludes any time spent on procedures.      Disposition and Plan     Clinical Impression:  Alcohol withdrawal syndrome without complication ([facility] [address]  (primary encounter diagnosis)  Tachycardia    Disposition:  Admit    Follow-up:  Jonh Cameron rapid heartbeat; it was associated with shortness of breath and a discomfort in the middle of her chest.  She had a great deal of vomiting associated with this as well and some diarrhea as well as some chills.   She presented to the emergency room, stating multivitamin once a day; nicotine inhalation 1 puff as needed for smoking cessation; olanzapine 5 mg as needed.     ALLERGIES:  Bupropion, Neurontin, penicillin, tramadol, Zoloft, metoprolol, cqlwxepe-kjsvxyp-xghkwp, fluocinolone acetonide, Cymbalta, Darvoc mood and affect were somewhat withdrawn but cooperative. BACK:  There was no costovertebral angle tenderness.       LABORATORY DATA:  Glucose 159, sodium 131, potassium 3.7, chloride 93, CO2 of 23, BUN of 12 with a creatinine of 0.68, calcium 9.0, anion ga MEDICATIONS ADMINISTERED IN LAST 1 DAY:      Vitals (last day) before discharge     Date/Time Temp Pulse Resp BP SpO2 Weight O2 Device O2 Flow Rate (L/min) Who    11/29/21 1200 97.9 °F (36.6 °C) 88 16 111/74 97 % — None (Room air) — MO    11/29/21 10 11/28/21 0811 0 AM    11/28/21 0600 6 MM    11/28/21 0400 8 MM    11/28/21 0200 3 MM    11/28/21 0000 3 MM    11/27/21 2200 4 MM    11/27/21 2000 5 MM    11/27/21 1758 7 AM    11/27/21 1548 4 AM    11/27/21 1352 3 AM    11/27/21 1200 3 AM    11/27/21 1000 1.4 (L) 11/28/2021     HGB 12.5 11/28/2021     HCT 36.7 11/28/2021     .0 (L) 11/28/2021     CREATSERUM 0.40 (L) 11/28/2021     BUN 5 (L) 11/28/2021      (L) 11/28/2021     K 2.9 (LL) 11/28/2021      11/28/2021     CO2 23.0 11/28/2021 weakness. Psychiatric/Behavioral: Positive for sleep disturbance and depressed mood. Negative for hallucinations.  The patient is nervous/anxious.       Objective:      Blood pressure 111/74, pulse 88, temperature 97.9 °F (36.6 °C), temperature source [facility]  Patient is admitted to remote telemetry with IV fluids and CIWA protocol, including thiamine, folic acid, multivitamin.  Will monitor on remote telemetry.   2.       Persistent sinus tachycardia, likely related to her alcohol withdrawal.  Continue to monit

## 2021-12-02 NOTE — DISCHARGE SUMMARY
[facility]    PATIENT'S NAME: Juliette Moran   ATTENDING PHYSICIAN: Madan Treviño MD   PATIENT ACCOUNT#:   [id number]    LOCATION:  5SWSE [address] RECORD #:   [id number]       YOB: 1970  ADMISSION DATE:       11/ advice, not willing to stay to see Psychiatry or get any followup for thyroid or any other medications. PHYSICAL EXAMINATION:    VITAL SIGNS:  On discharge temperature 97.9, pulse 88, respiratory rate 18, blood pressure 111/74, 97%.   LUNGS:  Occasional Hyperlipidemia, probably _______  statin. 9.   Thyroid disorder. I checked thyroid function tests. The patient is significantly hypothyroid. Would ask her to follow up with her primary doctor. CONDITION ON DISCHARGE:  Unstable.   Against medical adv

## 2021-12-03 ENCOUNTER — HOSPITAL ENCOUNTER (EMERGENCY)
Facility: HOSPITAL | Age: 51
Discharge: HOME | End: 2021-12-03
Attending: EMERGENCY MEDICINE
Payer: MEDICARE

## 2021-12-03 VITALS
DIASTOLIC BLOOD PRESSURE: 61 MMHG | SYSTOLIC BLOOD PRESSURE: 120 MMHG | OXYGEN SATURATION: 95 % | HEART RATE: 115 BPM | TEMPERATURE: 99 F | RESPIRATION RATE: 18 BRPM

## 2021-12-03 DIAGNOSIS — F10.10 ALCOHOL ABUSE: ICD-10-CM

## 2021-12-03 DIAGNOSIS — R00.2 PALPITATIONS: Primary | ICD-10-CM

## 2021-12-03 PROCEDURE — 99285 EMERGENCY DEPT VISIT HI MDM: CPT

## 2021-12-03 PROCEDURE — 96361 HYDRATE IV INFUSION ADD-ON: CPT

## 2021-12-03 PROCEDURE — 96375 TX/PRO/DX INJ NEW DRUG ADDON: CPT

## 2021-12-03 PROCEDURE — 83735 ASSAY OF MAGNESIUM: CPT | Performed by: EMERGENCY MEDICINE

## 2021-12-03 PROCEDURE — 93010 ELECTROCARDIOGRAM REPORT: CPT | Performed by: EMERGENCY MEDICINE

## 2021-12-03 PROCEDURE — 85025 COMPLETE CBC W/AUTO DIFF WBC: CPT | Performed by: EMERGENCY MEDICINE

## 2021-12-03 PROCEDURE — 93005 ELECTROCARDIOGRAM TRACING: CPT

## 2021-12-03 PROCEDURE — 96365 THER/PROPH/DIAG IV INF INIT: CPT

## 2021-12-03 PROCEDURE — 80048 BASIC METABOLIC PNL TOTAL CA: CPT | Performed by: EMERGENCY MEDICINE

## 2021-12-03 RX ORDER — ONDANSETRON 2 MG/ML
4 INJECTION INTRAMUSCULAR; INTRAVENOUS ONCE
Status: COMPLETED | OUTPATIENT
Start: 2021-12-03 | End: 2021-12-03

## 2021-12-03 RX ORDER — POTASSIUM CHLORIDE 20 MEQ/1
40 TABLET, EXTENDED RELEASE ORAL ONCE
Status: COMPLETED | OUTPATIENT
Start: 2021-12-03 | End: 2021-12-03

## 2021-12-03 RX ORDER — LORAZEPAM 2 MG/ML
0.5 INJECTION INTRAMUSCULAR ONCE
Status: COMPLETED | OUTPATIENT
Start: 2021-12-03 | End: 2021-12-03

## 2021-12-03 RX ORDER — MAGNESIUM SULFATE HEPTAHYDRATE 40 MG/ML
2 INJECTION, SOLUTION INTRAVENOUS ONCE
Status: COMPLETED | OUTPATIENT
Start: 2021-12-03 | End: 2021-12-03

## 2021-12-03 RX ORDER — PANTOPRAZOLE SODIUM 40 MG/1
40 TABLET, DELAYED RELEASE ORAL DAILY
Qty: 30 TABLET | Refills: 0 | Status: SHIPPED | OUTPATIENT
Start: 2021-12-03 | End: 2022-01-02

## 2021-12-03 RX ORDER — CHLORDIAZEPOXIDE HYDROCHLORIDE 25 MG/1
25 CAPSULE, GELATIN COATED ORAL 3 TIMES DAILY PRN
Qty: 9 CAPSULE | Refills: 0 | Status: SHIPPED | OUTPATIENT
Start: 2021-12-03 | End: 2021-12-06

## 2021-12-03 RX ORDER — ONDANSETRON 4 MG/1
4 TABLET, ORALLY DISINTEGRATING ORAL EVERY 8 HOURS PRN
Qty: 15 TABLET | Refills: 0 | Status: SHIPPED | OUTPATIENT
Start: 2021-12-03 | End: 2021-12-29

## 2021-12-03 NOTE — ED INITIAL ASSESSMENT (HPI)
Pt to ed via ems for palpitations r/t etoh abuse. Pt sts she is a chronic alcoholic, last drink this am was vodka. Pt sts the palpitations Naun Roys been getting worse over the last 2 weeks. \" Pt admits to chronic ETOH abuse and sts, \"I've been in and out of
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
## 2021-12-06 ENCOUNTER — TELEPHONE (OUTPATIENT)
Dept: FAMILY MEDICINE CLINIC | Facility: CLINIC | Age: 51
End: 2021-12-06

## 2021-12-06 NOTE — TELEPHONE ENCOUNTER
Pt needs refills that she was put on when in rehab. Advised pt will need to have pharmacy send refill request with correct meds. Pt will call pharmacy. Also advised to make follow up appt. Appt scheduled. Pt requested video visit.

## 2021-12-09 NOTE — ED QUICK NOTES
This rn called public safety for patient's belongings around [phone], patient walked out to the nursing station informing rn to call public safety again and tell them to \"hurry up\".  rn called security again per patient request and asked patient to wait in h

## 2021-12-09 NOTE — ED NOTES
Met with Tatolesly Mccrary after learning from ED nurse [facility] that she scored a 2 on the [address] Tato Mccrary reports some depression/grief related to the loss of her mother 7 months ago. Tato Hermann reports that her mother  of Cirrhosis.       Tato Mccrary has a long history of ET services for her alcohol abuse. Steven Monterroso does present with some depression but is not presenting as Certifiable. Offered her treatment referrals. She reports that she already had treatment providers if she wanted to return to them.   Steven Monterroso did state that
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
## 2021-12-13 PROBLEM — R16.0 HEPATOMEGALY: Status: ACTIVE | Noted: 2021-12-13

## 2021-12-13 NOTE — PROGRESS NOTES
Virtual telephone Check-In    Jacey Kilpatrick verbally consents to a Virtual/Telephone Check-In visit on 12/13/21. Patient understands and accepts financial responsibility for any deductible, co-insurance and/or co-pays associated with this service. • Hoarseness, chronic    • IBS (irritable bowel syndrome)    • Migraines    • OSTEOARTHRITIS     back, knees and hip   • OTHER DISEASES     endometriosis   • OTHER DISEASES     colon polyp-will get path for me to determine f/up   • OTHER DISEASES age 23 BREAKFAST. 90 tablet 0   • LORazepam 0.5 MG Oral Tab Take 1 tablet (0.5 mg total) by mouth 2 (two) times daily. 60 tablet 1   • Butalbital-Acetaminophen  MG Oral Cap Take 1 tablet by mouth every 6 (six) hours as needed (migraine).  30 capsule 0   • Mu Dispense: 30 tablet; Refill: 0  I recommended MD SARGENT at [facility] as an outpatient program.  She has been to an inpatient program previously.       If this note is coded by time based on the Office/Outpatient Evaluation and Management Codes effective J

## 2021-12-14 ENCOUNTER — APPOINTMENT (OUTPATIENT)
Dept: CT IMAGING | Facility: HOSPITAL | Age: 51
End: 2021-12-14
Attending: EMERGENCY MEDICINE
Payer: MEDICARE

## 2021-12-14 ENCOUNTER — HOSPITAL ENCOUNTER (EMERGENCY)
Facility: HOSPITAL | Age: 51
Discharge: HOME | End: 2021-12-14
Attending: EMERGENCY MEDICINE
Payer: MEDICARE

## 2021-12-14 VITALS
TEMPERATURE: 99 F | BODY MASS INDEX: 19.63 KG/M2 | HEART RATE: 106 BPM | HEIGHT: 64 IN | DIASTOLIC BLOOD PRESSURE: 82 MMHG | SYSTOLIC BLOOD PRESSURE: 115 MMHG | OXYGEN SATURATION: 100 % | RESPIRATION RATE: 15 BRPM | WEIGHT: 115 LBS

## 2021-12-14 DIAGNOSIS — E83.42 HYPOMAGNESEMIA: Primary | ICD-10-CM

## 2021-12-14 DIAGNOSIS — K52.9 COLITIS: ICD-10-CM

## 2021-12-14 PROCEDURE — 96365 THER/PROPH/DIAG IV INF INIT: CPT

## 2021-12-14 PROCEDURE — C9113 INJ PANTOPRAZOLE SODIUM, VIA: HCPCS | Performed by: EMERGENCY MEDICINE

## 2021-12-14 PROCEDURE — 83690 ASSAY OF LIPASE: CPT | Performed by: EMERGENCY MEDICINE

## 2021-12-14 PROCEDURE — 96375 TX/PRO/DX INJ NEW DRUG ADDON: CPT

## 2021-12-14 PROCEDURE — 80076 HEPATIC FUNCTION PANEL: CPT | Performed by: EMERGENCY MEDICINE

## 2021-12-14 PROCEDURE — 82272 OCCULT BLD FECES 1-3 TESTS: CPT

## 2021-12-14 PROCEDURE — 99291 CRITICAL CARE FIRST HOUR: CPT

## 2021-12-14 PROCEDURE — 85025 COMPLETE CBC W/AUTO DIFF WBC: CPT | Performed by: EMERGENCY MEDICINE

## 2021-12-14 PROCEDURE — 82077 ASSAY SPEC XCP UR&BREATH IA: CPT | Performed by: EMERGENCY MEDICINE

## 2021-12-14 PROCEDURE — 80048 BASIC METABOLIC PNL TOTAL CA: CPT | Performed by: EMERGENCY MEDICINE

## 2021-12-14 PROCEDURE — 96366 THER/PROPH/DIAG IV INF ADDON: CPT

## 2021-12-14 PROCEDURE — 96361 HYDRATE IV INFUSION ADD-ON: CPT

## 2021-12-14 PROCEDURE — 74177 CT ABD & PELVIS W/CONTRAST: CPT | Performed by: EMERGENCY MEDICINE

## 2021-12-14 PROCEDURE — 83735 ASSAY OF MAGNESIUM: CPT | Performed by: EMERGENCY MEDICINE

## 2021-12-14 RX ORDER — MAGNESIUM SULFATE HEPTAHYDRATE 40 MG/ML
2 INJECTION, SOLUTION INTRAVENOUS ONCE
Status: COMPLETED | OUTPATIENT
Start: 2021-12-14 | End: 2021-12-14

## 2021-12-14 RX ORDER — ONDANSETRON 2 MG/ML
4 INJECTION INTRAMUSCULAR; INTRAVENOUS ONCE
Status: COMPLETED | OUTPATIENT
Start: 2021-12-14 | End: 2021-12-14

## 2021-12-14 RX ORDER — LORAZEPAM 2 MG/ML
1 INJECTION INTRAMUSCULAR ONCE
Status: COMPLETED | OUTPATIENT
Start: 2021-12-14 | End: 2021-12-14

## 2021-12-14 NOTE — ED QUICK NOTES
PATIENT APPROVED FOR DISCHARGE PER ER PROVIDER. PATIENT GIVEN VERBAL AND WRITTEN DISCHARGE INSTRUCTIONS. GIVEN INSTRUCTIONS ON  FOLLOW UP WITH PCP AND SYMPTOMS THAT NECESSITATE COMING BACK TO ED. VERBALIZES UNDERSTANDING OF DISCHARGE INSTRUCTIONS.      VEL

## 2021-12-14 NOTE — ED PROVIDER NOTES
Patient Seen in: [facility] AND [facility] Emergency Department      History   Patient presents with:  Alcohol Intoxication  Abdomen/Flank Pain    Stated Complaint: etoh/abd pain    Subjective:   HPI    Pt is 53 yo F who arrives by EMS from home with 2 days of n Phosphatase 242 (*)     All other components within normal limits   BASIC METABOLIC PANEL (8) - Abnormal; Notable for the following components:    Glucose 106 (*)     Potassium 3.1 (*)     Chloride 92 (*)     CO2 34.0 (*)     All other components within no and reviewed by myself and findings discussed with patient including need for follow up       Medical Record Review: I personally reviewed available prior medical records for any recent pertinent discharge summaries, testing, and procedures and reviewed th

## 2021-12-14 NOTE — ED INITIAL ASSESSMENT (HPI)
Patient brought in by ems for etoh and abd pain. EMS states that patient has had vomiting and diarrhea. Per patient she has end stage renal disease and a hx of alcoholism.  Patient is complaining of RUQ pain that is now radiating to the left side at this ti

## 2021-12-16 ENCOUNTER — TELEPHONE (OUTPATIENT)
Dept: FAMILY MEDICINE CLINIC | Facility: CLINIC | Age: 51
End: 2021-12-16

## 2021-12-16 NOTE — TELEPHONE ENCOUNTER
Place she is staying at has lost all of her medications. She will need replacements and a blood sugar monitor.  Pt is requesting Dr Nikki Good call her please

## 2021-12-17 RX ORDER — ALBUTEROL SULFATE 90 UG/1
2 AEROSOL, METERED RESPIRATORY (INHALATION) EVERY 4 HOURS PRN
Qty: 1 EACH | Refills: 0 | Status: SHIPPED | OUTPATIENT
Start: 2021-12-17

## 2021-12-17 NOTE — TELEPHONE ENCOUNTER
Only meds we Rx are metoclopramide (Rx'd 12/13/21), levothyroxine (11/17/21), lorazepam (11/9/21), and butalbitol/acetaminophen (11/5/21). Pt also requesting blood glucose monitor. We have never Rx'd her one and she does not have a dx requiring one.

## 2021-12-17 NOTE — TELEPHONE ENCOUNTER
Spoke with pt. Conversation very disorganized as her thoughts change often. Informed she doesn't have any meds due for refills, so if her meds were lost, we need a police report to justify refills.  Pt states \"girl, don't tempt me\" and said that the s

## 2021-12-17 NOTE — TELEPHONE ENCOUNTER
See notes from speaking to pt. She is looking for refill of old proair inhaler. That is the only med she is requesting.  She did not tell me that all of her meds are lost.

## 2021-12-25 ENCOUNTER — HOSPITAL ENCOUNTER (EMERGENCY)
Facility: HOSPITAL | Age: 51
Discharge: HOME | End: 2021-12-25
Attending: EMERGENCY MEDICINE
Payer: MEDICARE

## 2021-12-25 VITALS
OXYGEN SATURATION: 93 % | TEMPERATURE: 98 F | HEART RATE: 70 BPM | RESPIRATION RATE: 18 BRPM | WEIGHT: 112 LBS | HEIGHT: 62 IN | BODY MASS INDEX: 20.61 KG/M2 | DIASTOLIC BLOOD PRESSURE: 84 MMHG | SYSTOLIC BLOOD PRESSURE: 134 MMHG

## 2021-12-25 DIAGNOSIS — J44.1 COPD EXACERBATION (HCC): ICD-10-CM

## 2021-12-25 DIAGNOSIS — K29.00 ACUTE GASTRITIS WITHOUT HEMORRHAGE, UNSPECIFIED GASTRITIS TYPE: Primary | ICD-10-CM

## 2021-12-25 LAB
ALBUMIN SERPL-MCNC: 3.9 G/DL (ref 3.4–5)
ALP LIVER SERPL-CCNC: 260 U/L
ALT SERPL-CCNC: 71 U/L
ANION GAP SERPL CALC-SCNC: 14 MMOL/L (ref 0–18)
AST SERPL-CCNC: 138 U/L (ref 15–37)
BASOPHILS # BLD AUTO: 0.06 X10(3) UL (ref 0–0.2)
BASOPHILS NFR BLD AUTO: 1.4 %
BILIRUB DIRECT SERPL-MCNC: 0.3 MG/DL (ref 0–0.2)
BILIRUB SERPL-MCNC: 0.6 MG/DL (ref 0.1–2)
BUN BLD-MCNC: 10 MG/DL (ref 7–18)
BUN/CREAT SERPL: 17.5 (ref 10–20)
CALCIUM BLD-MCNC: 8.6 MG/DL (ref 8.5–10.1)
CHLORIDE SERPL-SCNC: 99 MMOL/L (ref 98–112)
CO2 SERPL-SCNC: 25 MMOL/L (ref 21–32)
CREAT BLD-MCNC: 0.57 MG/DL
DEPRECATED RDW RBC AUTO: 53.8 FL (ref 35.1–46.3)
EOSINOPHIL # BLD AUTO: 0.08 X10(3) UL (ref 0–0.7)
EOSINOPHIL NFR BLD AUTO: 1.9 %
ERYTHROCYTE [DISTWIDTH] IN BLOOD BY AUTOMATED COUNT: 15 % (ref 11–15)
GLUCOSE BLD-MCNC: 96 MG/DL (ref 70–99)
HCT VFR BLD AUTO: 42.5 %
HGB BLD-MCNC: 14.8 G/DL
IMM GRANULOCYTES # BLD AUTO: 0.01 X10(3) UL (ref 0–1)
IMM GRANULOCYTES NFR BLD: 0.2 %
LIPASE SERPL-CCNC: 197 U/L (ref 73–393)
LYMPHOCYTES # BLD AUTO: 2.23 X10(3) UL (ref 1–4)
LYMPHOCYTES NFR BLD AUTO: 52.2 %
MAGNESIUM SERPL-MCNC: 1.7 MG/DL (ref 1.6–2.6)
MCH RBC QN AUTO: 33.6 PG (ref 26–34)
MCHC RBC AUTO-ENTMCNC: 34.8 G/DL (ref 31–37)
MCV RBC AUTO: 96.4 FL
MONOCYTES # BLD AUTO: 0.43 X10(3) UL (ref 0.1–1)
MONOCYTES NFR BLD AUTO: 10.1 %
NEUTROPHILS # BLD AUTO: 1.46 X10 (3) UL (ref 1.5–7.7)
NEUTROPHILS # BLD AUTO: 1.46 X10(3) UL (ref 1.5–7.7)
NEUTROPHILS NFR BLD AUTO: 34.2 %
OSMOLALITY SERPL CALC.SUM OF ELEC: 285 MOSM/KG (ref 275–295)
PLATELET # BLD AUTO: 176 10(3)UL (ref 150–450)
POTASSIUM SERPL-SCNC: 3.2 MMOL/L (ref 3.5–5.1)
PROT SERPL-MCNC: 7.5 G/DL (ref 6.4–8.2)
RBC # BLD AUTO: 4.41 X10(6)UL
SARS-COV-2 RNA RESP QL NAA+PROBE: NOT DETECTED
SODIUM SERPL-SCNC: 138 MMOL/L (ref 136–145)
WBC # BLD AUTO: 4.3 X10(3) UL (ref 4–11)

## 2021-12-25 PROCEDURE — 83735 ASSAY OF MAGNESIUM: CPT | Performed by: EMERGENCY MEDICINE

## 2021-12-25 PROCEDURE — 83690 ASSAY OF LIPASE: CPT | Performed by: EMERGENCY MEDICINE

## 2021-12-25 PROCEDURE — 99285 EMERGENCY DEPT VISIT HI MDM: CPT

## 2021-12-25 PROCEDURE — S0028 INJECTION, FAMOTIDINE, 20 MG: HCPCS | Performed by: EMERGENCY MEDICINE

## 2021-12-25 PROCEDURE — 96375 TX/PRO/DX INJ NEW DRUG ADDON: CPT

## 2021-12-25 PROCEDURE — 85025 COMPLETE CBC W/AUTO DIFF WBC: CPT | Performed by: EMERGENCY MEDICINE

## 2021-12-25 PROCEDURE — 94644 CONT INHLJ TX 1ST HOUR: CPT

## 2021-12-25 PROCEDURE — 80076 HEPATIC FUNCTION PANEL: CPT | Performed by: EMERGENCY MEDICINE

## 2021-12-25 PROCEDURE — 96374 THER/PROPH/DIAG INJ IV PUSH: CPT

## 2021-12-25 PROCEDURE — 96361 HYDRATE IV INFUSION ADD-ON: CPT

## 2021-12-25 PROCEDURE — 80048 BASIC METABOLIC PNL TOTAL CA: CPT | Performed by: EMERGENCY MEDICINE

## 2021-12-25 PROCEDURE — 99284 EMERGENCY DEPT VISIT MOD MDM: CPT

## 2021-12-25 RX ORDER — ALBUTEROL SULFATE 2.5 MG/3ML
5 SOLUTION RESPIRATORY (INHALATION) ONCE
Status: COMPLETED | OUTPATIENT
Start: 2021-12-25 | End: 2021-12-25

## 2021-12-25 RX ORDER — FAMOTIDINE 10 MG/ML
20 INJECTION, SOLUTION INTRAVENOUS ONCE
Status: COMPLETED | OUTPATIENT
Start: 2021-12-25 | End: 2021-12-25

## 2021-12-25 RX ORDER — ONDANSETRON 2 MG/ML
4 INJECTION INTRAMUSCULAR; INTRAVENOUS ONCE
Status: COMPLETED | OUTPATIENT
Start: 2021-12-25 | End: 2021-12-25

## 2021-12-25 RX ORDER — METOCLOPRAMIDE HYDROCHLORIDE 5 MG/ML
10 INJECTION INTRAMUSCULAR; INTRAVENOUS ONCE
Status: COMPLETED | OUTPATIENT
Start: 2021-12-25 | End: 2021-12-25

## 2021-12-25 NOTE — ED INITIAL ASSESSMENT (HPI)
Pt complain of nausea/ vomiting 3 x this am. Pt admits to 1/4 of a bottle of vodka.  No fall, no other complaints

## 2021-12-25 NOTE — ED PROVIDER NOTES
Patient Seen in: [facility] AND [facility] Emergency Department      History   Patient presents with:  Nausea/Vomiting/Diarrhea    Stated Complaint:     Subjective:   HPI    46-year-old female with past medical history significant for anemia, anxiety, COPD,  generalized abdominal tenderness without rebound or guarding. Nondistended. Soft. Bowel sounds are normal.   Back:   : Musculoskeletal: Normal range of motion. No deformity. Lymphadenopathy: No sig cervical LAD   Neurological: Awake, alert.  Normal r without hemorrhage, unspecified gastritis type  (primary encounter diagnosis)  COPD exacerbation ([facility] [address]     Disposition:  Discharge  12/25/2021  9:51 pm    Follow-up:  Harman Metzger MD  [facility] [address] [facility] [address] [address]  [phone]    Call in 2

## 2021-12-26 ENCOUNTER — APPOINTMENT (OUTPATIENT)
Dept: CT IMAGING | Facility: HOSPITAL | Age: 51
End: 2021-12-26
Attending: EMERGENCY MEDICINE
Payer: MEDICARE

## 2021-12-26 ENCOUNTER — HOSPITAL ENCOUNTER (EMERGENCY)
Facility: HOSPITAL | Age: 51
Discharge: HOME | End: 2021-12-26
Attending: EMERGENCY MEDICINE
Payer: MEDICARE

## 2021-12-26 VITALS
BODY MASS INDEX: 20 KG/M2 | DIASTOLIC BLOOD PRESSURE: 76 MMHG | WEIGHT: 112 LBS | RESPIRATION RATE: 14 BRPM | HEART RATE: 76 BPM | OXYGEN SATURATION: 100 % | SYSTOLIC BLOOD PRESSURE: 126 MMHG | TEMPERATURE: 98 F

## 2021-12-26 DIAGNOSIS — H53.8 BLURRY VISION, BILATERAL: Primary | ICD-10-CM

## 2021-12-26 LAB
ALBUMIN SERPL-MCNC: 3.6 G/DL (ref 3.4–5)
ALP LIVER SERPL-CCNC: 239 U/L
ALT SERPL-CCNC: 68 U/L
ANION GAP SERPL CALC-SCNC: 14 MMOL/L (ref 0–18)
AST SERPL-CCNC: 117 U/L (ref 15–37)
BASOPHILS # BLD AUTO: 0.06 X10(3) UL (ref 0–0.2)
BASOPHILS NFR BLD AUTO: 1.2 %
BILIRUB DIRECT SERPL-MCNC: 0.3 MG/DL (ref 0–0.2)
BILIRUB SERPL-MCNC: 0.6 MG/DL (ref 0.1–2)
BUN BLD-MCNC: 7 MG/DL (ref 7–18)
BUN/CREAT SERPL: 14.6 (ref 10–20)
CALCIUM BLD-MCNC: 8.3 MG/DL (ref 8.5–10.1)
CHLORIDE SERPL-SCNC: 102 MMOL/L (ref 98–112)
CO2 SERPL-SCNC: 25 MMOL/L (ref 21–32)
CREAT BLD-MCNC: 0.48 MG/DL
DEPRECATED RDW RBC AUTO: 55 FL (ref 35.1–46.3)
EOSINOPHIL # BLD AUTO: 0.09 X10(3) UL (ref 0–0.7)
EOSINOPHIL NFR BLD AUTO: 1.8 %
ERYTHROCYTE [DISTWIDTH] IN BLOOD BY AUTOMATED COUNT: 15.4 % (ref 11–15)
GLUCOSE BLD-MCNC: 89 MG/DL (ref 70–99)
HCT VFR BLD AUTO: 39.2 %
HGB BLD-MCNC: 13.4 G/DL
IMM GRANULOCYTES # BLD AUTO: 0.01 X10(3) UL (ref 0–1)
IMM GRANULOCYTES NFR BLD: 0.2 %
LYMPHOCYTES # BLD AUTO: 2.62 X10(3) UL (ref 1–4)
LYMPHOCYTES NFR BLD AUTO: 52 %
MCH RBC QN AUTO: 33.1 PG (ref 26–34)
MCHC RBC AUTO-ENTMCNC: 34.2 G/DL (ref 31–37)
MCV RBC AUTO: 96.8 FL
MONOCYTES # BLD AUTO: 0.39 X10(3) UL (ref 0.1–1)
MONOCYTES NFR BLD AUTO: 7.7 %
NEUTROPHILS # BLD AUTO: 1.87 X10 (3) UL (ref 1.5–7.7)
NEUTROPHILS # BLD AUTO: 1.87 X10(3) UL (ref 1.5–7.7)
NEUTROPHILS NFR BLD AUTO: 37.1 %
OSMOLALITY SERPL CALC.SUM OF ELEC: 289 MOSM/KG (ref 275–295)
PLATELET # BLD AUTO: 156 10(3)UL (ref 150–450)
POTASSIUM SERPL-SCNC: 3.4 MMOL/L (ref 3.5–5.1)
PROT SERPL-MCNC: 7.4 G/DL (ref 6.4–8.2)
RBC # BLD AUTO: 4.05 X10(6)UL
SODIUM SERPL-SCNC: 141 MMOL/L (ref 136–145)
WBC # BLD AUTO: 5 X10(3) UL (ref 4–11)

## 2021-12-26 PROCEDURE — 85025 COMPLETE CBC W/AUTO DIFF WBC: CPT | Performed by: EMERGENCY MEDICINE

## 2021-12-26 PROCEDURE — 70450 CT HEAD/BRAIN W/O DYE: CPT | Performed by: EMERGENCY MEDICINE

## 2021-12-26 PROCEDURE — 80048 BASIC METABOLIC PNL TOTAL CA: CPT | Performed by: EMERGENCY MEDICINE

## 2021-12-26 PROCEDURE — 82248 BILIRUBIN DIRECT: CPT | Performed by: EMERGENCY MEDICINE

## 2021-12-26 PROCEDURE — 36415 COLL VENOUS BLD VENIPUNCTURE: CPT

## 2021-12-26 PROCEDURE — 80076 HEPATIC FUNCTION PANEL: CPT | Performed by: EMERGENCY MEDICINE

## 2021-12-26 PROCEDURE — 80053 COMPREHEN METABOLIC PANEL: CPT | Performed by: EMERGENCY MEDICINE

## 2021-12-26 PROCEDURE — 99284 EMERGENCY DEPT VISIT MOD MDM: CPT

## 2021-12-26 RX ORDER — ONDANSETRON 4 MG/1
4 TABLET, ORALLY DISINTEGRATING ORAL ONCE
Status: COMPLETED | OUTPATIENT
Start: 2021-12-26 | End: 2021-12-26
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
## 2021-12-26 NOTE — ED QUICK NOTES
Pt got dressed and walked out of room. Requesting IV be removed because she needed to go before it got too late. This RN removed IV and asked if pt could wait until DC paperwork printed. Pt declined.

## 2021-12-27 NOTE — ED QUICK NOTES
Orders for admission, patient is aware of plan and ready to go upstairs. Any questions, please call ED Randa Harry RN    Vaccinated? yes  Type of COVID test sent:  COVID Suspicion level: Low/High: low      Titratable drug(s) infusing:  Rate:None    LOC at time o
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
## 2021-12-27 NOTE — ED PROVIDER NOTES
Patient Seen in: [facility] Emergency Department      History   Patient presents with:  Eval-D    Stated Complaint: alcohol detox    Subjective:   HPI    Patient is a 72-year-old female presents to emergency room with alcohol withdrawal and request (postoperative nausea and vomiting)     after gall bladder surgery   • Uncomfortable fullness after meals    • Wears partial dentures     upper partial              Past Surgical History:   Procedure Laterality Date   • BACK SURGERY     • CHOLECYSTECTOMY reactive to light and accommodation, extraocular motion is intact, sclerae white, conjunctiva is pink. Oropharynx is unremarkable, no exudate. NECK: Supple, trachea midline, no lymphadenopathy.    LUNG: Lungs clear to auscultation bilaterally, no wheezing influenza A, influenza B, and respiratory syncytial virus (RSV) viral RNA in nasopharyngeal swab from individuals suspected of respiratory viral infection consistent with COVID-19 by their healthcare provider.  Signs and symptoms of respiratory viral infect MG/0.4ML injection 40 mg (has no administration in time range)   LORazepam (ATIVAN) tab 1 mg (has no administration in time range)     Or   LORazepam (ATIVAN) injection 1 mg (has no administration in time range)     Or   LORazepam (ATIVAN) tab 2 mg (has no transaminases    Disposition:  Admit  12/27/2021  1:29 am    Follow-up:  No follow-up provider specified.         Medications Prescribed:  Current Discharge Medication List                          Hospital Problems             Present on Admission  Date Re

## 2021-12-27 NOTE — PLAN OF CARE
NURSING ADMISSION NOTE      Patient admitted via Cart  Oriented to room. Safety precautions initiated. Bed in low position. Call light in reach. Received patient from ED via cart. Alert and oriented. On room air, breath sounds clear.  On CIWA protoc

## 2021-12-27 NOTE — ED INITIAL ASSESSMENT (HPI)
A/O x 4. Patient presents requesting alcohol detox. Normally drinks fifth of vodka daily. Last drink was about 10am.  Reports nausea at this time. States that she was at [facility] for detox about a year ago.   Reports history of seizures with withdrawal

## 2021-12-27 NOTE — BH PROGRESS NOTE
Debbie Arteaga from [employer] went to see the patient a few times today for possible cd treatment options. The patient was sleeping each time he checked on her. He will try again tomorrow. The patients nurse, Teresita Nyhan is aware.

## 2021-12-28 NOTE — PLAN OF CARE
NURSING DISCHARGE NOTE    Discharged Home via Wheelchair. Accompanied by RN  Belongings Taken by patient/family.   Pt no longer detoxing and ready for discharge  Pt IV removed  Medication changes made  Discharge paperwork discussed  All questions answe

## 2021-12-28 NOTE — DISCHARGE SUMMARY
[facility] HOSPITALIST  DISCHARGE SUMMARY     Nikita Neetu Patient Status:  Observation    1970 MRN [id number]   [facility]NE[facility]A Attending Renae Hurley MD   Hosp Day # 0 PCP Julien Bliss MD     Date of Admission: 2021 29, 2021      Take 1 tablet (100 mg total) by mouth daily.    Quantity: 30 tablet  Refills: 0        CONTINUE taking these medications      Instructions Prescription details   albuterol 108 (90 Base) MCG/ACT Aers  Commonly known as: ProAir HFA      Inhale 2 Vitamin D 50 MCG (2000 UT) Caps      Take 5,000 Units by mouth daily.    Refills: 0           Where to Get Your Medications      These medications were sent to [address] [facility] [phone], [address]

## 2021-12-29 ENCOUNTER — TELEPHONE (OUTPATIENT)
Dept: FAMILY MEDICINE CLINIC | Facility: CLINIC | Age: 51
End: 2021-12-29

## 2021-12-29 ENCOUNTER — PATIENT OUTREACH (OUTPATIENT)
Dept: CASE MANAGEMENT | Age: 51
End: 2021-12-29

## 2021-12-29 DIAGNOSIS — F10.230 ALCOHOL WITHDRAWAL SYNDROME WITHOUT COMPLICATION (HCC): ICD-10-CM

## 2021-12-29 DIAGNOSIS — Z02.9 ENCOUNTERS FOR UNSPECIFIED ADMINISTRATIVE PURPOSE: ICD-10-CM

## 2021-12-29 DIAGNOSIS — F10.20 ALCOHOL USE DISORDER, SEVERE, DEPENDENCE (HCC): ICD-10-CM

## 2021-12-29 RX ORDER — BUSPIRONE HYDROCHLORIDE 5 MG/1
5 TABLET ORAL 3 TIMES DAILY
Qty: 90 TABLET | Refills: 0 | Status: SHIPPED | OUTPATIENT
Start: 2021-12-29 | End: 2022-02-03

## 2021-12-29 RX ORDER — ONDANSETRON 4 MG/1
4 TABLET, ORALLY DISINTEGRATING ORAL EVERY 8 HOURS PRN
Qty: 15 TABLET | Refills: 0 | Status: SHIPPED | OUTPATIENT
Start: 2021-12-29 | End: 2022-01-10

## 2021-12-29 RX ORDER — METOCLOPRAMIDE 10 MG/1
10 TABLET ORAL
Qty: 90 TABLET | Refills: 0 | Status: SHIPPED | OUTPATIENT
Start: 2021-12-29

## 2021-12-29 NOTE — TELEPHONE ENCOUNTER
Spoke with pt she is requesting  Ondanestron. metoclopramide. and Buspironr  Refills  Last visit 12/13/21 a VV

## 2021-12-29 NOTE — PROGRESS NOTES
Initial Post Discharge Follow Up   Discharge Date: 12/28/21  Contact Date: 12/29/2021    Consent Verification:  Assessment Completed With: Patient  HIPAA Verified?   Yes    Discharge Dx:  ETOH withdrawal  ETOH hepatitis  Migraines  Anxiety/Depression  Ch Sig Dispense Refill   • thiamine 100 MG Oral Tab Take 1 tablet (100 mg total) by mouth daily. 30 tablet 0   • Magnesium 100 MG Oral Tab Take by mouth. • Potassium Chloride ER 10 MEQ Oral Tab CR Take 10 mEq by mouth 2 (two) times daily.      • albuterol No  • Did you  your discharge medications when you left the hospital? Yes  • May I go over your medications with you to make sure we are not missing anything? yes  • Are there any reasons that keep you from taking your medication as prescribed?  Yes, Discharge Summary, Discharge medications reviewed/discussed/and reconciled against outpatient medications with patient,  and orders reviewed and discussed. Any changes or updates to medications and or orders sent to PCP.

## 2021-12-29 NOTE — TELEPHONE ENCOUNTER
Pt discharged 12-28-21, alcohol withdrawal syndrome. Pt does not have HFU appt scheduled at this time. NCM tried to schedule however pt declined to schedule appt on 1-7-22 because that is the anniversary of her mother's death.  Pt is requesting video appt

## 2021-12-29 NOTE — TELEPHONE ENCOUNTER
Pt said she needs refills on \"benzo\", cholecalciferol, metoclopramide and buspirone to [facility] in [place]

## 2021-12-29 NOTE — TELEPHONE ENCOUNTER
DIANE just spoke to pt today for TCM. She is requesting refills asap since she is out of those medications. Thank you!

## 2022-01-10 ENCOUNTER — TELEMEDICINE (OUTPATIENT)
Dept: FAMILY MEDICINE CLINIC | Facility: CLINIC | Age: 52
End: 2022-01-10
Payer: MEDICARE

## 2022-01-10 DIAGNOSIS — F41.9 ANXIETY DISORDER, UNSPECIFIED TYPE: ICD-10-CM

## 2022-01-10 DIAGNOSIS — R10.9 CHRONIC ABDOMINAL PAIN: Primary | ICD-10-CM

## 2022-01-10 DIAGNOSIS — G89.29 CHRONIC ABDOMINAL PAIN: Primary | ICD-10-CM

## 2022-01-10 DIAGNOSIS — F10.20 ALCOHOL USE DISORDER, SEVERE, DEPENDENCE (HCC): ICD-10-CM

## 2022-01-10 PROCEDURE — 99214 OFFICE O/P EST MOD 30 MIN: CPT | Performed by: FAMILY MEDICINE

## 2022-01-10 RX ORDER — ONDANSETRON 4 MG/1
4 TABLET, ORALLY DISINTEGRATING ORAL EVERY 8 HOURS PRN
Qty: 90 TABLET | Refills: 2 | Status: SHIPPED | OUTPATIENT
Start: 2022-01-10 | End: 2022-04-10

## 2022-01-10 RX ORDER — LORAZEPAM 0.5 MG/1
0.5 TABLET ORAL 2 TIMES DAILY
Qty: 60 TABLET | Refills: 2 | Status: SHIPPED | OUTPATIENT
Start: 2022-01-10 | End: 2022-01-25

## 2022-01-10 RX ORDER — CYCLOBENZAPRINE HCL 5 MG
5 TABLET ORAL EVERY 8 HOURS PRN
Qty: 60 TABLET | Refills: 2 | Status: SHIPPED | OUTPATIENT
Start: 2022-01-10
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
## 2022-01-26 ENCOUNTER — TELEPHONE (OUTPATIENT)
Dept: FAMILY MEDICINE CLINIC | Facility: CLINIC | Age: 52
End: 2022-01-26

## 2022-01-26 RX ORDER — BUSPIRONE HYDROCHLORIDE 5 MG/1
5 TABLET ORAL 3 TIMES DAILY
Qty: 90 TABLET | Refills: 0 | OUTPATIENT
Start: 2022-01-26
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
## 2022-01-26 NOTE — ED INITIAL ASSESSMENT (HPI)
Patient brought in by EMS for etoh. Per patient she drank a 1/4 bottle of vodka today.  States \"go hard or go home\" Patient states that she feels like she can't take care of herself and for the last couple days states that she has had difficulty breathing

## 2022-01-26 NOTE — TELEPHONE ENCOUNTER
LR 12/29/21 please disregard previous note from   pt asked to have Lorazepam filled, but it was already sent to pharmacy yesterday by another provider      lov 1/10/22

## 2022-01-31 ENCOUNTER — TELEMEDICINE (OUTPATIENT)
Dept: FAMILY MEDICINE CLINIC | Facility: CLINIC | Age: 52
End: 2022-01-31
Payer: MEDICARE

## 2022-01-31 DIAGNOSIS — J45.20 MILD INTERMITTENT ASTHMA, UNSPECIFIED WHETHER COMPLICATED: Primary | ICD-10-CM

## 2022-01-31 PROCEDURE — 99213 OFFICE O/P EST LOW 20 MIN: CPT | Performed by: FAMILY MEDICINE

## 2022-01-31 RX ORDER — SOFT LENS DISINFECTANT
SOLUTION, NON-ORAL MISCELLANEOUS
Qty: 1 EACH | Refills: 0 | Status: SHIPPED | OUTPATIENT
Start: 2022-01-31

## 2022-01-31 NOTE — PROGRESS NOTES
Virtualtelephone Check-In    Almas Sheehan verbally consents to a Virtual/Telephone Check-In visit on 01/31/22. Patient understands and accepts financial responsibility for any deductible, co-insurance and/or co-pays associated with this service. Personal history of pneumonia (recurrent)    • PONV (postoperative nausea and vomiting)     after gall bladder surgery   • Uncomfortable fullness after meals    • Wears partial dentures     upper partial     PAST SURGICAL HISTORY:  Past Surgical History: mouth every 8 (eight) hours as needed. 90 tablet 2   • cyclobenzaprine 5 MG Oral Tab Take 1 tablet (5 mg total) by mouth every 8 (eight) hours as needed for Muscle spasms.  60 tablet 2   • busPIRone 5 MG Oral Tab Take 1 tablet (5 mg total) by mouth 3 (three Grandfather    • Other (hypothyroidism) Maternal Grandmother    • Other (pancreatic neoplasm) Maternal Grandmother    • Other (stroke syndrome) Maternal Grandfather    • Colon Cancer Paternal Aunt        PHYSICAL EXAM:  There were no vitals taken for this above.

## 2022-02-01 ENCOUNTER — HOSPITAL ENCOUNTER (EMERGENCY)
Facility: HOSPITAL | Age: 52
Discharge: HOME | End: 2022-02-02
Attending: EMERGENCY MEDICINE
Payer: MEDICARE

## 2022-02-01 DIAGNOSIS — F32.A DEPRESSION, UNSPECIFIED DEPRESSION TYPE: Primary | ICD-10-CM

## 2022-02-01 LAB
ALBUMIN SERPL-MCNC: 4 G/DL (ref 3.4–5)
ALP LIVER SERPL-CCNC: 245 U/L
ALT SERPL-CCNC: 94 U/L
AMPHET UR QL SCN: NEGATIVE
ANION GAP SERPL CALC-SCNC: 12 MMOL/L (ref 0–18)
APAP SERPL-MCNC: <2 UG/ML (ref 10–30)
APTT PPP: 21.1 SECONDS (ref 23.3–35.6)
AST SERPL-CCNC: 133 U/L (ref 15–37)
BARBITURATES UR QL SCN: NEGATIVE
BASOPHILS # BLD AUTO: 0.02 X10(3) UL (ref 0–0.2)
BASOPHILS NFR BLD AUTO: 1 %
BILIRUB DIRECT SERPL-MCNC: 0.4 MG/DL (ref 0–0.2)
BILIRUB SERPL-MCNC: 0.8 MG/DL (ref 0.1–2)
BILIRUB UR QL: NEGATIVE
BUN BLD-MCNC: 14 MG/DL (ref 7–18)
BUN/CREAT SERPL: 25.9 (ref 10–20)
CALCIUM BLD-MCNC: 9.9 MG/DL (ref 8.5–10.1)
CHLORIDE SERPL-SCNC: 100 MMOL/L (ref 98–112)
CO2 SERPL-SCNC: 25 MMOL/L (ref 21–32)
COCAINE UR QL: NEGATIVE
COLOR UR: YELLOW
CREAT BLD-MCNC: 0.54 MG/DL
CREAT UR-SCNC: 113 MG/DL
DEPRECATED RDW RBC AUTO: 54.7 FL (ref 35.1–46.3)
EOSINOPHIL # BLD AUTO: 0.05 X10(3) UL (ref 0–0.7)
EOSINOPHIL NFR BLD AUTO: 2.6 %
ERYTHROCYTE [DISTWIDTH] IN BLOOD BY AUTOMATED COUNT: 15.2 % (ref 11–15)
ETHANOL SERPL-MCNC: <3 MG/DL (ref ?–3)
GLUCOSE BLD-MCNC: 129 MG/DL (ref 70–99)
GLUCOSE UR-MCNC: NEGATIVE MG/DL
HCT VFR BLD AUTO: 44 %
HGB BLD-MCNC: 15 G/DL
HGB UR QL STRIP.AUTO: NEGATIVE
IMM GRANULOCYTES # BLD AUTO: 0.01 X10(3) UL (ref 0–1)
IMM GRANULOCYTES NFR BLD: 0.5 %
INR BLD: 0.95 (ref 0.8–1.2)
KETONES UR-MCNC: NEGATIVE MG/DL
LEUKOCYTE ESTERASE UR QL STRIP.AUTO: NEGATIVE
LYMPHOCYTES # BLD AUTO: 0.98 X10(3) UL (ref 1–4)
LYMPHOCYTES NFR BLD AUTO: 50 %
MCHC RBC AUTO-ENTMCNC: 34.1 G/DL (ref 31–37)
MCV RBC AUTO: 98.7 FL
MDMA UR QL SCN: NEGATIVE
METHADONE UR QL SCN: NEGATIVE
MONOCYTES # BLD AUTO: 0.3 X10(3) UL (ref 0.1–1)
MONOCYTES NFR BLD AUTO: 15.3 %
NEUTROPHILS # BLD AUTO: 0.6 X10 (3) UL (ref 1.5–7.7)
NEUTROPHILS # BLD AUTO: 0.6 X10(3) UL (ref 1.5–7.7)
NEUTROPHILS NFR BLD AUTO: 30.6 %
NITRITE UR QL STRIP.AUTO: POSITIVE
OPIATES UR QL SCN: NEGATIVE
OSMOLALITY SERPL CALC.SUM OF ELEC: 286 MOSM/KG (ref 275–295)
OXYCODONE UR QL SCN: NEGATIVE
PCP UR QL SCN: NEGATIVE
PH UR: 7 [PH] (ref 5–8)
PLATELET # BLD AUTO: 138 10(3)UL (ref 150–450)
POTASSIUM SERPL-SCNC: 3.2 MMOL/L (ref 3.5–5.1)
PROT SERPL-MCNC: 8 G/DL (ref 6.4–8.2)
PROT UR-MCNC: NEGATIVE MG/DL
PROTHROMBIN TIME: 12.8 SECONDS (ref 11.6–14.8)
RBC # BLD AUTO: 4.46 X10(6)UL
SALICYLATES SERPL-MCNC: 4.9 MG/DL (ref 2.8–20)
SARS-COV-2 RNA RESP QL NAA+PROBE: NOT DETECTED
SODIUM SERPL-SCNC: 137 MMOL/L (ref 136–145)
SP GR UR STRIP: 1.01 (ref 1–1.03)
UROBILINOGEN UR STRIP-ACNC: <2
WBC # BLD AUTO: 2 X10(3) UL (ref 4–11)

## 2022-02-01 PROCEDURE — 85025 COMPLETE CBC W/AUTO DIFF WBC: CPT | Performed by: EMERGENCY MEDICINE

## 2022-02-01 PROCEDURE — 80048 BASIC METABOLIC PNL TOTAL CA: CPT | Performed by: EMERGENCY MEDICINE

## 2022-02-01 PROCEDURE — 87186 SC STD MICRODIL/AGAR DIL: CPT | Performed by: EMERGENCY MEDICINE

## 2022-02-01 PROCEDURE — 87077 CULTURE AEROBIC IDENTIFY: CPT | Performed by: EMERGENCY MEDICINE

## 2022-02-01 PROCEDURE — 85060 BLOOD SMEAR INTERPRETATION: CPT | Performed by: EMERGENCY MEDICINE

## 2022-02-01 PROCEDURE — 80076 HEPATIC FUNCTION PANEL: CPT | Performed by: EMERGENCY MEDICINE

## 2022-02-01 PROCEDURE — 80143 DRUG ASSAY ACETAMINOPHEN: CPT | Performed by: EMERGENCY MEDICINE

## 2022-02-01 PROCEDURE — 82077 ASSAY SPEC XCP UR&BREATH IA: CPT | Performed by: EMERGENCY MEDICINE

## 2022-02-01 PROCEDURE — 80307 DRUG TEST PRSMV CHEM ANLYZR: CPT | Performed by: EMERGENCY MEDICINE

## 2022-02-01 PROCEDURE — 87086 URINE CULTURE/COLONY COUNT: CPT | Performed by: EMERGENCY MEDICINE

## 2022-02-01 PROCEDURE — 85610 PROTHROMBIN TIME: CPT | Performed by: EMERGENCY MEDICINE

## 2022-02-01 PROCEDURE — 36415 COLL VENOUS BLD VENIPUNCTURE: CPT

## 2022-02-01 PROCEDURE — 85730 THROMBOPLASTIN TIME PARTIAL: CPT | Performed by: EMERGENCY MEDICINE

## 2022-02-01 PROCEDURE — 99285 EMERGENCY DEPT VISIT HI MDM: CPT

## 2022-02-01 PROCEDURE — 80179 DRUG ASSAY SALICYLATE: CPT | Performed by: EMERGENCY MEDICINE

## 2022-02-01 PROCEDURE — 81001 URINALYSIS AUTO W/SCOPE: CPT | Performed by: EMERGENCY MEDICINE

## 2022-02-01 RX ORDER — LORAZEPAM 1 MG/1
1 TABLET ORAL ONCE
Status: COMPLETED | OUTPATIENT
Start: 2022-02-01 | End: 2022-02-01

## 2022-02-01 RX ORDER — BUSPIRONE HYDROCHLORIDE 5 MG/1
5 TABLET ORAL ONCE
Status: COMPLETED | OUTPATIENT
Start: 2022-02-01 | End: 2022-02-01

## 2022-02-01 NOTE — ED NOTES
Spoke to Simone with [facility] [employer]. He will be down to see Maryuri Coy after he finishes with a patient on the med floor.
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
## 2022-02-01 NOTE — ED QUICK NOTES
Pt's friend is getting her belongings from the apartment and bringing them here. Pt was seen by Tiara from [place] and [facility]. Waiting to hear from [facility] for placement.
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
## 2022-02-01 NOTE — ED QUICK NOTES
Pt has h/o alcohol abuse. States has not had any alcohol in 5 days. Pt states has not been able to take a shower at home.

## 2022-02-02 VITALS
DIASTOLIC BLOOD PRESSURE: 89 MMHG | TEMPERATURE: 97 F | HEIGHT: 64 IN | WEIGHT: 110 LBS | SYSTOLIC BLOOD PRESSURE: 112 MMHG | RESPIRATION RATE: 16 BRPM | HEART RATE: 88 BPM | BODY MASS INDEX: 18.78 KG/M2 | OXYGEN SATURATION: 96 %

## 2022-02-02 RX ORDER — NICOTINE 21 MG/24HR
1 PATCH, TRANSDERMAL 24 HOURS TRANSDERMAL DAILY
Status: DISCONTINUED | OUTPATIENT
Start: 2022-02-02 | End: 2022-02-02

## 2022-02-02 RX ORDER — METOCLOPRAMIDE 10 MG/1
10 TABLET ORAL ONCE
Status: COMPLETED | OUTPATIENT
Start: 2022-02-02 | End: 2022-02-02

## 2022-02-02 RX ORDER — LEVOTHYROXINE SODIUM 0.05 MG/1
50 TABLET ORAL ONCE
Status: COMPLETED | OUTPATIENT
Start: 2022-02-02 | End: 2022-02-02

## 2022-02-02 RX ORDER — BUSPIRONE HYDROCHLORIDE 5 MG/1
5 TABLET ORAL ONCE
Status: COMPLETED | OUTPATIENT
Start: 2022-02-02 | End: 2022-02-02

## 2022-02-02 RX ORDER — LORAZEPAM 1 MG/1
1 TABLET ORAL ONCE
Status: COMPLETED | OUTPATIENT
Start: 2022-02-02 | End: 2022-02-02

## 2022-02-02 NOTE — ED QUICK NOTES
Called [facility] center and spoke to Roge Villa, per Roge Villa there is nothing that can be done tonight to admit pt to facility. She states we would have to call at 0700 and speak to [facility] regarding placement.      Womens [phone]

## 2022-02-02 NOTE — ED NOTES
Spoke to Mitch. [facility] is unable to accomodate due to insurance. Leeanna Coppola told [facility]su she has DMG but bennett not have policy information, can not verify. [facility] in [place] has no beds and their detox unit is closed due to COVID.leslie TREVIZO  Has not returned Michael's calls in 24 hours. [facility] does not have any availability before next week. Mitch will follow up with the patient. He also advised that if timothy can produce the DMG coverage, [facility] would consider her for admission.

## 2022-02-02 NOTE — CM/SW NOTE
TAY SPOKE WITH PATIENT AND INFORMED HER OF THE MULTIPLE SHELTERS EDCM CALLED. INFORMED PATIENT THAT WE COULD NOT GET HER INTO A SHELTER TONIGHT AND GAVE PATIENT HANDOUTS OF SHELTERS AND THEIR NUMBERS FOR THE MORNING. TAY ASKED PATIENT IF SHE HAD A FRIEND OR FAMILY MEMBER WHO SHE MIGHT BE ABLE TO CALL. PATIENT STATES SHE DOESN'T HAVE HER PHONE SO SHE CAN'T CALL ANYONE.  TAY SPOKE WITH DR SANTOS AND INFORMED HER AND THE NURSE BECKFORD OF THE SAME. WILL CONTINUE TO MONITOR AND FOLLOW UP THROUGHOUT THE NIGHT.

## 2022-02-02 NOTE — ED NOTES
Called Michael with [facility]. He is en-route to the Hospital.  Requested he call me when he arrives to further discuss care/referrals.

## 2022-02-02 NOTE — ED NOTES
[facility] Called Michael with [facility]. He reports that Sunny Dyer was referred to [facility] in [place] and that Tyrone Munson was to  Follow up with  The patient. Michael added that the roommate was bringing Abeba's belongings tot he ER and that patient;s friend who lives at [facility] would make arrangements for Sunny Dyer to stay somewhere if Sunny Dyer as not able to get into the program tonsyed. Updated Haile. Belongings have not yet materialized.
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
## 2022-02-02 NOTE — CM/SW NOTE
CM received call from [facility], stating no available placement for shelter by their facilities. RN notified to provide patient with bedside phone to call [phone] option 1 to get on list.    CM to remain available for support and/or discharge planning.     Abraham Andrew RN, [facility]  ER   Ext. [phone]

## 2022-02-02 NOTE — CM/SW NOTE
Cm left message at [facility] atten: Deo Hurst on shelter placement for this pt. Awaiting call back. CM to remain available for support and/or discharge planning.     Handy Fuentes RN, [facility]  ER   Ext. [phone]

## 2022-02-02 NOTE — CM/SW NOTE
EDCM WAS CALLED BY DR [facility] AND ASKED TO FIND SHELTER FOR THE NIGHT FOR THIS PATIENT.    [id number] [phone] AND LEFT A MESSAGE TO CALL BACK  [facility] [address] [facility] [place], [place] SPOKE WITH THEM THEY HAVE NO AVAILABILITY TONIGHT  [facility] CALLED [address]  [phone] LEFT A MESSAGE TO CALL ME BACK  [facility] [phone] LEFT MESSAGE  EDCM CALLED [facility] [phone] LEFT MESSAGE  [facility] CALLED [address] [phone] LEFT MESSAGE  [facility] [address]ED [address] [phone] LEFT A MESSAGE  [facility] CALLED [facility] [phone] NO ANSWER  [facility] [phone] LEFT A MESSAGE  EDCM CALLED [facility] [phone] SPOKE WITH A WOMAN WHO SAID THEY ARE CLOSED NOW AND TO CALL BACK IN THE MORNING  [facility] [phone] LEFT MESSAGE

## 2022-02-02 NOTE — ED QUICK NOTES
Called [facility] and asked to speak to Paddy. Per person who answer Paddy is gone and won't be in till tomorrow at 18.

## 2022-02-03 RX ORDER — BUSPIRONE HYDROCHLORIDE 5 MG/1
5 TABLET ORAL 3 TIMES DAILY
Qty: 90 TABLET | Refills: 0 | Status: SHIPPED | OUTPATIENT
Start: 2022-02-03

## 2022-02-07 ENCOUNTER — TELEMEDICINE (OUTPATIENT)
Dept: FAMILY MEDICINE CLINIC | Facility: CLINIC | Age: 52
End: 2022-02-07
Payer: MEDICARE

## 2022-02-07 DIAGNOSIS — F32.1 CURRENT MODERATE EPISODE OF MAJOR DEPRESSIVE DISORDER WITHOUT PRIOR EPISODE (HCC): ICD-10-CM

## 2022-02-07 DIAGNOSIS — R74.8 ELEVATED LIVER ENZYMES: ICD-10-CM

## 2022-02-07 DIAGNOSIS — F41.9 ANXIETY DISORDER, UNSPECIFIED TYPE: ICD-10-CM

## 2022-02-07 DIAGNOSIS — J41.0 SMOKERS' COUGH (HCC): Chronic | ICD-10-CM

## 2022-02-07 DIAGNOSIS — R16.0 HEPATOMEGALY: Primary | ICD-10-CM

## 2022-02-07 PROBLEM — F10.930 ALCOHOL WITHDRAWAL SYNDROME WITHOUT COMPLICATION (HCC): Status: RESOLVED | Noted: 2021-11-19 | Resolved: 2022-02-07

## 2022-02-07 PROBLEM — R00.0 TACHYCARDIA: Status: RESOLVED | Noted: 2021-11-27 | Resolved: 2022-02-07

## 2022-02-07 PROBLEM — F33.1 MODERATE EPISODE OF RECURRENT MAJOR DEPRESSIVE DISORDER (HCC): Status: RESOLVED | Noted: 2021-11-27 | Resolved: 2022-02-07

## 2022-02-07 PROBLEM — F10.230 ALCOHOL WITHDRAWAL SYNDROME WITHOUT COMPLICATION (HCC): Status: RESOLVED | Noted: 2021-11-19 | Resolved: 2022-02-07

## 2022-02-07 PROCEDURE — 99214 OFFICE O/P EST MOD 30 MIN: CPT | Performed by: FAMILY MEDICINE

## 2022-02-07 RX ORDER — NITROFURANTOIN 25; 75 MG/1; MG/1
100 CAPSULE ORAL 2 TIMES DAILY
COMMUNITY
Start: 2022-02-03

## 2022-02-08 RX ORDER — METOCLOPRAMIDE 10 MG/1
10 TABLET ORAL 3 TIMES DAILY PRN
Qty: 20 TABLET | Refills: 0 | Status: SHIPPED | OUTPATIENT
Start: 2022-02-08 | End: 2022-02-16

## 2022-02-08 NOTE — TELEPHONE ENCOUNTER
Patient was in to see Jonah Martinez yesterday and forgot to request an extension on the Reglan she received in the ER.

## 2022-02-09 ENCOUNTER — HOSPITAL ENCOUNTER (EMERGENCY)
Facility: HOSPITAL | Age: 52
Discharge: HOME | End: 2022-02-10
Attending: EMERGENCY MEDICINE
Payer: MEDICARE

## 2022-02-09 ENCOUNTER — TELEPHONE (OUTPATIENT)
Dept: FAMILY MEDICINE CLINIC | Facility: CLINIC | Age: 52
End: 2022-02-09

## 2022-02-09 DIAGNOSIS — Z65.8 DOMESTIC PROBLEMS: Primary | ICD-10-CM

## 2022-02-09 PROCEDURE — 80076 HEPATIC FUNCTION PANEL: CPT | Performed by: EMERGENCY MEDICINE

## 2022-02-09 PROCEDURE — 99285 EMERGENCY DEPT VISIT HI MDM: CPT

## 2022-02-09 PROCEDURE — 80048 BASIC METABOLIC PNL TOTAL CA: CPT | Performed by: EMERGENCY MEDICINE

## 2022-02-09 PROCEDURE — 85025 COMPLETE CBC W/AUTO DIFF WBC: CPT | Performed by: EMERGENCY MEDICINE

## 2022-02-09 PROCEDURE — 36415 COLL VENOUS BLD VENIPUNCTURE: CPT

## 2022-02-09 NOTE — TELEPHONE ENCOUNTER
Spoke with pt she states where she is currently living is not working out. Pt states she is living with someone with psychiatric issues. Pt states she would like to be placed in a skilled nursing facility. I did reinforce with pt that Dr Patty Mohr placed a referral for  that is awaiting authorization. Pt denies suicidal or homicidal ideations. Pt states she did drink 1/4 of a bottle of vodka today.

## 2022-02-10 VITALS
HEART RATE: 115 BPM | WEIGHT: 110 LBS | TEMPERATURE: 99 F | DIASTOLIC BLOOD PRESSURE: 87 MMHG | BODY MASS INDEX: 18.55 KG/M2 | RESPIRATION RATE: 20 BRPM | OXYGEN SATURATION: 95 % | SYSTOLIC BLOOD PRESSURE: 127 MMHG | HEIGHT: 64.5 IN

## 2022-02-10 LAB
ALBUMIN SERPL-MCNC: 3.4 G/DL (ref 3.4–5)
ALP LIVER SERPL-CCNC: 181 U/L
ALT SERPL-CCNC: 52 U/L
ANION GAP SERPL CALC-SCNC: 12 MMOL/L (ref 0–18)
AST SERPL-CCNC: 47 U/L (ref 15–37)
BASOPHILS # BLD AUTO: 0.11 X10(3) UL (ref 0–0.2)
BASOPHILS NFR BLD AUTO: 2.1 %
BILIRUB DIRECT SERPL-MCNC: 0.2 MG/DL (ref 0–0.2)
BILIRUB SERPL-MCNC: 0.3 MG/DL (ref 0.1–2)
BILIRUB UR QL: NEGATIVE
BUN BLD-MCNC: 7 MG/DL (ref 7–18)
BUN/CREAT SERPL: 14.6 (ref 10–20)
CALCIUM BLD-MCNC: 9.1 MG/DL (ref 8.5–10.1)
CHLORIDE SERPL-SCNC: 107 MMOL/L (ref 98–112)
CO2 SERPL-SCNC: 25 MMOL/L (ref 21–32)
COLOR UR: YELLOW
CREAT BLD-MCNC: 0.48 MG/DL
DEPRECATED RDW RBC AUTO: 52.4 FL (ref 35.1–46.3)
EOSINOPHIL # BLD AUTO: 0.09 X10(3) UL (ref 0–0.7)
EOSINOPHIL NFR BLD AUTO: 1.7 %
ERYTHROCYTE [DISTWIDTH] IN BLOOD BY AUTOMATED COUNT: 14 % (ref 11–15)
GLUCOSE BLD-MCNC: 85 MG/DL (ref 70–99)
GLUCOSE UR-MCNC: NEGATIVE MG/DL
HCT VFR BLD AUTO: 39 %
HGB BLD-MCNC: 13.2 G/DL
HGB UR QL STRIP.AUTO: NEGATIVE
HYALINE CASTS #/AREA URNS AUTO: PRESENT /LPF
IMM GRANULOCYTES # BLD AUTO: 0.02 X10(3) UL (ref 0–1)
IMM GRANULOCYTES NFR BLD: 0.4 %
KETONES UR-MCNC: 20 MG/DL
LEUKOCYTE ESTERASE UR QL STRIP.AUTO: NEGATIVE
LYMPHOCYTES # BLD AUTO: 3.24 X10(3) UL (ref 1–4)
LYMPHOCYTES NFR BLD AUTO: 61.5 %
MCH RBC QN AUTO: 34.6 PG (ref 26–34)
MCHC RBC AUTO-ENTMCNC: 33.8 G/DL (ref 31–37)
MCV RBC AUTO: 102.1 FL
MONOCYTES # BLD AUTO: 0.46 X10(3) UL (ref 0.1–1)
MONOCYTES NFR BLD AUTO: 8.7 %
NEUTROPHILS # BLD AUTO: 1.35 X10 (3) UL (ref 1.5–7.7)
NEUTROPHILS # BLD AUTO: 1.35 X10(3) UL (ref 1.5–7.7)
NEUTROPHILS NFR BLD AUTO: 25.6 %
NITRITE UR QL STRIP.AUTO: NEGATIVE
OSMOLALITY SERPL CALC.SUM OF ELEC: 295 MOSM/KG (ref 275–295)
PH UR: 5 [PH] (ref 5–8)
PLATELET # BLD AUTO: 346 10(3)UL (ref 150–450)
POTASSIUM SERPL-SCNC: 3.6 MMOL/L (ref 3.5–5.1)
PROT SERPL-MCNC: 7.2 G/DL (ref 6.4–8.2)
PROT UR-MCNC: NEGATIVE MG/DL
RBC # BLD AUTO: 3.82 X10(6)UL
SARS-COV-2 RNA RESP QL NAA+PROBE: NOT DETECTED
SODIUM SERPL-SCNC: 144 MMOL/L (ref 136–145)
SP GR UR STRIP: 1.02 (ref 1–1.03)
UROBILINOGEN UR STRIP-ACNC: 2
WBC # BLD AUTO: 5.3 X10(3) UL (ref 4–11)

## 2022-02-10 PROCEDURE — 81001 URINALYSIS AUTO W/SCOPE: CPT | Performed by: EMERGENCY MEDICINE

## 2022-02-10 RX ORDER — NICOTINE 21 MG/24HR
1 PATCH, TRANSDERMAL 24 HOURS TRANSDERMAL ONCE
Status: DISCONTINUED | OUTPATIENT
Start: 2022-02-10 | End: 2022-02-10

## 2022-02-10 RX ORDER — IBUPROFEN 600 MG/1
600 TABLET ORAL ONCE
Status: COMPLETED | OUTPATIENT
Start: 2022-02-10 | End: 2022-02-10

## 2022-02-10 RX ORDER — LOPERAMIDE HYDROCHLORIDE 2 MG/1
2 CAPSULE ORAL ONCE
Status: COMPLETED | OUTPATIENT
Start: 2022-02-10 | End: 2022-02-10

## 2022-02-10 NOTE — ED NOTES
Call from ROMAIN Becerra RN case Manager. She  advised that Rosibel Nunez is now declining to meet with [place] Representative. Called back Mitch and updated him.
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
## 2022-02-10 NOTE — ED QUICK NOTES
OK to take own supply of Reglan and Lorazepam per Dr. Kaley Andrews. Patient updated on CM notes and referrals sent. Patient expressed understanding. Patient states she does not have any other friends/relatives in the area she can reach out to.

## 2022-02-10 NOTE — ED QUICK NOTES
This RN picked up pt's medications from pharmacy but pt has already walked out of the ER. Medications walked back down to pharmacy to be held until pt can return for her medications. Pt called and message left.

## 2022-02-10 NOTE — CM/SW NOTE
Walked over to talk with patient and see if she needed a ride home, and informed by RN that patient had walked out of ER.

## 2022-02-10 NOTE — CM/SW NOTE
After speaking with patient regarding detox facilities and her willingness to speak with [facility] rep, patient declined stating that she had spoken with a [facility] rep on one of her last visits to [address] [place] [address] and it \"didn't amount to anything\".       [facility] Counselor notified to cancel the [facility] rep as patient is declining
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
## 2022-02-14 NOTE — TELEPHONE ENCOUNTER
Received fax from [facility] [place], requesting history/physical and current med list.  Pt had televisit 2/7/22,  Do you want to see pt in office or okay to fax this note?

## 2022-02-15 ENCOUNTER — TELEPHONE (OUTPATIENT)
Dept: FAMILY MEDICINE CLINIC | Facility: CLINIC | Age: 52
End: 2022-02-15

## 2022-02-15 NOTE — TELEPHONE ENCOUNTER
Patient calling stating that she has been trying on her own to get into a skilled nursing facility and that doctor was to get her a . States that she is sleeping a lot and everything hurts. Please advise.

## 2022-02-16 RX ORDER — METOCLOPRAMIDE 10 MG/1
TABLET ORAL
Qty: 20 TABLET | Refills: 0 | Status: SHIPPED | OUTPATIENT
Start: 2022-02-16 | End: 2022-02-28

## 2022-02-22 ENCOUNTER — TELEPHONE (OUTPATIENT)
Dept: FAMILY MEDICINE CLINIC | Facility: CLINIC | Age: 52
End: 2022-02-22

## 2022-02-22 NOTE — TELEPHONE ENCOUNTER
Pt states she is waiting to hear back from [facility]. She has left a message with them on Friday. Pt states she spoke to [place]. Advised pt to try calling again today to see if there is hold up. All paperwork has already been faxed by our office, we are not able to admit pt  Advised pt if she is not feeling well, she can also go to ER. Advised if drinking to not drive. Pt expressed understanding. brown/soft

## 2022-02-22 NOTE — TELEPHONE ENCOUNTER
PT STATES SHE NEEDS DR MENENDEZ'S HELP GETTING INTO A NURSING HOME. SHE SAYS SHE IS \"FADING FAST\". SHE IS HAVING A HARD TIME FINDING A NURSING HOME TO GET INTO. SHE IS A HEAVY DRINKER. ENERGY LEVEL IS LOW. PLS CALL HER BACK TO [facility].

## 2022-02-24 PROBLEM — K70.31 ALCOHOLIC CIRRHOSIS OF LIVER WITH ASCITES (HCC): Status: ACTIVE | Noted: 2022-02-24

## 2022-02-24 NOTE — TELEPHONE ENCOUNTER
Pt was rejected from them and now needs Dr to refer her to another facility asap, pt is dying.   Please call Xolair Pregnancy And Lactation Text: This medication is Pregnancy Category B and is considered safe during pregnancy. This medication is excreted in breast milk.

## 2022-02-25 NOTE — TELEPHONE ENCOUNTER
Pt said [facility] has denied her application. She said it is due to doctors notes and she wants to discuss.

## 2022-02-28 ENCOUNTER — TELEPHONE (OUTPATIENT)
Dept: FAMILY MEDICINE CLINIC | Facility: CLINIC | Age: 52
End: 2022-02-28

## 2022-02-28 RX ORDER — METOCLOPRAMIDE 10 MG/1
TABLET ORAL
Qty: 20 TABLET | Refills: 0 | Status: SHIPPED | OUTPATIENT
Start: 2022-02-28

## 2022-02-28 RX ORDER — LEVOTHYROXINE SODIUM 0.05 MG/1
50 TABLET ORAL
Qty: 90 TABLET | Refills: 0 | Status: SHIPPED | OUTPATIENT
Start: 2022-02-28

## 2022-02-28 RX ORDER — OMEPRAZOLE 20 MG/1
20 CAPSULE, DELAYED RELEASE ORAL
COMMUNITY
End: 2022-02-28

## 2022-02-28 RX ORDER — OMEPRAZOLE 20 MG/1
20 CAPSULE, DELAYED RELEASE ORAL
Qty: 30 CAPSULE | Refills: 0 | Status: SHIPPED | OUTPATIENT
Start: 2022-02-28

## 2022-02-28 RX ORDER — POTASSIUM CHLORIDE 750 MG/1
10 TABLET, FILM COATED, EXTENDED RELEASE ORAL 2 TIMES DAILY
Qty: 60 TABLET | Refills: 0 | Status: SHIPPED | OUTPATIENT
Start: 2022-02-28

## 2022-02-28 NOTE — TELEPHONE ENCOUNTER
Denied to [address]    Patient was told she hasn't been seen in 30 days therefore the paperwork is not current or acceptable. She was also told that she tried to commit suicide at 18 that also makes her unacceptable. This was years and years ago. They will also not take anyone with an addiction problem. Do we have any idea of what direction she should head in for care.

## 2022-02-28 NOTE — TELEPHONE ENCOUNTER
I received a form today to complete for [facility] supportive living. However it asks about her emergency contacts and financial support. There is 6 or 8 procedures about what to bring in what they have at the site. I do not think this packet was supposed to come to me.

## 2022-02-28 NOTE — TELEPHONE ENCOUNTER
Spoke to pt she states denny form and she will contact Alejandra and have them send to her. Form to denny.

## 2022-03-01 ENCOUNTER — TELEPHONE (OUTPATIENT)
Dept: FAMILY MEDICINE CLINIC | Facility: CLINIC | Age: 52
End: 2022-03-01

## 2022-03-01 NOTE — TELEPHONE ENCOUNTER
Estefany Mera called to let CZ know that Esther Silva is being admitted to the Inpatient Hospice Unit with [facility].

## 2022-03-04 ENCOUNTER — TELEPHONE (OUTPATIENT)
Dept: FAMILY MEDICINE CLINIC | Facility: CLINIC | Age: 52
End: 2022-03-04

## 2022-03-04 NOTE — TELEPHONE ENCOUNTER
Patient calling from [facility].  She states that she needs liver scan reports faxed ASAP to Corinne Blake at [phone]. Please advise.

## 2022-03-11 ENCOUNTER — TELEPHONE (OUTPATIENT)
Dept: FAMILY MEDICINE CLINIC | Facility: CLINIC | Age: 52
End: 2022-03-11

## 2022-03-11 NOTE — TELEPHONE ENCOUNTER
Spoke with patient, discussed that the provider she is assigned to will be evaluating and prescribing medications, patient agreed she states she figured we would not be able to since she is in a treatment facility.

## 2022-03-11 NOTE — TELEPHONE ENCOUNTER
Currently staying at [id number] [place] [address].   Daily Migraines  Patient was told she has Cirrohsis by   Estephania Cohen    She was told she had End Stage Liver Disease  She heard it in [facility]    Patient just wanted to update CZ. Patient is requesting Jamila Downs  She doesn't know how to get it there but was told her PCP needs to prescribe.

## 2022-03-11 NOTE — TELEPHONE ENCOUNTER
Pt called again - also needs inhaler prescribed (she does not know where to have it sent since she's in a nursing home and does not know how this works)

## 2022-03-14 ENCOUNTER — TELEPHONE (OUTPATIENT)
Dept: FAMILY MEDICINE CLINIC | Facility: CLINIC | Age: 52
End: 2022-03-14

## 2022-03-15 NOTE — TELEPHONE ENCOUNTER
Spoke to [employer], they are prescribing and suppling pt with medication,  [facility] is not to be prescribing anything for her.    They will talk to pt to not call office for med, and to ask nurse at facility  Nurse will f/u with pt

## 2023-02-04 NOTE — TELEPHONE ENCOUNTER
Having nurse talk to her Implemented All Universal Safety Interventions:  [place] to call system. Call bell, personal items and telephone within reach. Instruct patient to call for assistance. Room bathroom lighting operational. Non-slip footwear when patient is off stretcher. Physically safe environment: no spills, clutter or unnecessary equipment. Stretcher in lowest position, wheels locked, appropriate side rails in place.

## 2024-02-07 ENCOUNTER — TELEPHONE (OUTPATIENT)
Dept: FAMILY MEDICINE CLINIC | Facility: CLINIC | Age: 54
End: 2024-02-07

## 2024-02-07 ENCOUNTER — MOBILE ENCOUNTER (OUTPATIENT)
Dept: FAMILY MEDICINE CLINIC | Facility: CLINIC | Age: 54
End: 2024-02-07

## 2024-02-07 NOTE — TELEPHONE ENCOUNTER
Pt calling and is very upset  she states she is in [place],   She wants to have a video visit with Dr. Cameron  He does not have any openings until next week     She states it is important to talk to him this week.    She only wants to speak to Dr. Cameron

## 2024-02-08 NOTE — PROGRESS NOTES
Home phone call received at 10 PM on February 7, 2024. She had called the office this morning and was given a video visit appointment at my next available time slot. She stated “she is dying and the medical director at the hospital is trying to get a hold of her”. I told her that she needed to dial 911 and be taken back to the hospital. Our phone call then disconnected. She had hung up on me.

## 2024-02-08 NOTE — TELEPHONE ENCOUNTER
Patient had me paged at 10pm on 2/7/2024.  I told her it was inappropriate to call at this time for a discussion on her healthcare.  She stated she was dying and the medical director at her current institution was trying to get ahold of her.  I told her if there was a medical emergency to go to the er.  She then disconnected the call.

## 2024-12-20 NOTE — ED NOTES
Name of Medication(s) Requested:  Requested Prescriptions     Pending Prescriptions Disp Refills    SENNA-PLUS 8.6-50 MG per tablet [Pharmacy Med Name: SENNA-PLUS 8.6-50 MG TABS 8.6-50 Tablet] 30 tablet 0     Sig: Take 1 tablet by mouth daily    DULoxetine (CYMBALTA) 60 MG extended release capsule [Pharmacy Med Name: DULOXETINE HCL 60 MG CAP 60 Capsule] 30 capsule 0     Sig: Take 1 capsule by mouth daily    nortriptyline (PAMELOR) 10 MG capsule [Pharmacy Med Name: NORTRIPTYLINE HCL 10 MG CAP 10 Capsule] 30 capsule 0     Sig: Take 1 capsule by mouth nightly       Medication is on current medication list Yes    Dosage and directions were verified? Yes    Quantity verified: 90 day supply     Pharmacy Verified?  Yes    Last Appointment:  9/25/2024    Future appts:  Future Appointments   Date Time Provider Department Center   11/3/2025  2:00 PM Jhonatan Cruz PA [facility]        (If no appt send self scheduling link. .REFILLAPPT)  Scheduling request sent?     [] Yes  [x] No    Does patient need updated?  [] Yes  [x] No   Scanned the 2/1/22 [facility] Intervention Note and the amended note from the same day into [facility].

## (undated) NOTE — ED AVS SNAPSHOT
Jessica Green   MRN: [id number]    Department:  [facility] Emergency Department   Date of Visit:  10/26/2018           Disclosure     Insurance plans vary and the physician(s) referred by the ER may not be covered by your plan.  Please conta tell this physician (or your personal doctor if your instructions are to return to your personal doctor) about any new or lasting problems. The primary care or specialist physician will see patients referred from the [facility] Emergency Department.  Andrei Perdomo

## (undated) NOTE — LETTER
Date & Time: 11/20/2021, 6:20 AM  Patient: Lokesh Mukherjee  Encounter Provider(s):    MD Bassam Lino MD         This certifies that Leeann Kennedy, a patient at an [facility], am leaving the facil

## (undated) NOTE — LETTER
08/01/19        [facility] [place]  [address]      Dear [place],    [facility] records indicate that you have outstanding lab work and or testing that was ordered for you and has not yet been completed:  Orders Placed This Encounter

## (undated) NOTE — LETTER
09/17/20        Josey Rawls  [address] [place] [id number]    Dear Michelle Chavez records indicate that you have outstanding lab work and or testing that was ordered for you and has not yet been completed:  Orders Placed This Encounter

## (undated) NOTE — LETTER
10/20/20    Roney Lee  [address] [place] [phone]      Dear Sue Bhatti,    [facility] records indicate that you have outstanding lab work and or testing that was ordered for you and has not yet been completed:  Orders Placed This Encounter      L

## (undated) NOTE — ED AVS SNAPSHOT
Artie Fitzgerald   MRN: [id number]    Department:  [facility] Emergency Department   Date of Visit:  12/30/2018           Disclosure     Insurance plans vary and the physician(s) referred by the ER may not be covered by your plan.  Please conta tell this physician (or your personal doctor if your instructions are to return to your personal doctor) about any new or lasting problems. The primary care or specialist physician will see patients referred from the [facility] Emergency Department.  Patrice Espino